# Patient Record
Sex: FEMALE | Race: WHITE | NOT HISPANIC OR LATINO | Employment: STUDENT | URBAN - METROPOLITAN AREA
[De-identification: names, ages, dates, MRNs, and addresses within clinical notes are randomized per-mention and may not be internally consistent; named-entity substitution may affect disease eponyms.]

---

## 2017-04-20 ENCOUNTER — COMMUNICATION - HEALTHEAST (OUTPATIENT)
Dept: FAMILY MEDICINE | Facility: CLINIC | Age: 32
End: 2017-04-20

## 2017-06-07 ENCOUNTER — COMMUNICATION - HEALTHEAST (OUTPATIENT)
Dept: FAMILY MEDICINE | Facility: CLINIC | Age: 32
End: 2017-06-07

## 2017-06-19 ENCOUNTER — COMMUNICATION - HEALTHEAST (OUTPATIENT)
Dept: FAMILY MEDICINE | Facility: CLINIC | Age: 32
End: 2017-06-19

## 2017-06-22 ENCOUNTER — COMMUNICATION - HEALTHEAST (OUTPATIENT)
Dept: FAMILY MEDICINE | Facility: CLINIC | Age: 32
End: 2017-06-22

## 2017-06-28 ENCOUNTER — PRENATAL OFFICE VISIT - HEALTHEAST (OUTPATIENT)
Dept: FAMILY MEDICINE | Facility: CLINIC | Age: 32
End: 2017-06-28

## 2017-06-28 DIAGNOSIS — Z32.01 POSITIVE PREGNANCY TEST: ICD-10-CM

## 2017-06-28 DIAGNOSIS — N92.6 ABNORMAL MENSES: ICD-10-CM

## 2017-06-28 DIAGNOSIS — Z34.90 NORMAL PREGNANCY: ICD-10-CM

## 2017-06-28 LAB — HIV 1+2 AB+HIV1 P24 AG SERPL QL IA: NEGATIVE

## 2017-06-29 ENCOUNTER — HOSPITAL ENCOUNTER (OUTPATIENT)
Dept: ULTRASOUND IMAGING | Facility: CLINIC | Age: 32
Discharge: HOME | End: 2017-06-29
Attending: PHYSICIAN ASSISTANT

## 2017-06-29 DIAGNOSIS — Z34.90 NORMAL PREGNANCY: ICD-10-CM

## 2017-06-29 DIAGNOSIS — Z32.01 POSITIVE PREGNANCY TEST: ICD-10-CM

## 2017-06-29 LAB
HBV SURFACE AG SERPL QL IA: NEGATIVE
SYPHILIS RPR SCREEN - HISTORICAL: NORMAL

## 2017-07-26 ENCOUNTER — PRENATAL OFFICE VISIT - HEALTHEAST (OUTPATIENT)
Dept: FAMILY MEDICINE | Facility: CLINIC | Age: 32
End: 2017-07-26

## 2017-07-26 DIAGNOSIS — Z34.91 SUPERVISION OF NORMAL PREGNANCY IN FIRST TRIMESTER: ICD-10-CM

## 2017-11-08 ENCOUNTER — OFFICE VISIT (OUTPATIENT)
Dept: FAMILY MEDICINE | Facility: CLINIC | Age: 32
End: 2017-11-08

## 2017-11-08 VITALS
DIASTOLIC BLOOD PRESSURE: 74 MMHG | WEIGHT: 173.6 LBS | SYSTOLIC BLOOD PRESSURE: 119 MMHG | OXYGEN SATURATION: 98 % | TEMPERATURE: 98.2 F | RESPIRATION RATE: 16 BRPM | HEIGHT: 63 IN | BODY MASS INDEX: 30.76 KG/M2 | HEART RATE: 104 BPM

## 2017-11-08 DIAGNOSIS — O09.90 HIGH-RISK PREGNANCY, UNSPECIFIED TRIMESTER: ICD-10-CM

## 2017-11-08 DIAGNOSIS — K21.9 GASTROESOPHAGEAL REFLUX DISEASE, ESOPHAGITIS PRESENCE NOT SPECIFIED: ICD-10-CM

## 2017-11-08 DIAGNOSIS — F19.20 CHEMICAL DEPENDENCY (H): Primary | ICD-10-CM

## 2017-11-08 DIAGNOSIS — Z72.0 TOBACCO USE: ICD-10-CM

## 2017-11-08 DIAGNOSIS — N30.00 ACUTE CYSTITIS WITHOUT HEMATURIA: ICD-10-CM

## 2017-11-08 DIAGNOSIS — N63.0 LUMP OR MASS IN BREAST: ICD-10-CM

## 2017-11-08 DIAGNOSIS — F15.93 WITHDRAWAL FROM OTHER STIMULANT DRUG (H): ICD-10-CM

## 2017-11-08 PROBLEM — F60.3 BORDERLINE PERSONALITY DISORDER (H): Status: ACTIVE | Noted: 2017-11-08

## 2017-11-08 PROBLEM — F41.9 ANXIETY: Status: ACTIVE | Noted: 2017-11-08

## 2017-11-08 PROBLEM — F90.2 ATTENTION DEFICIT HYPERACTIVITY DISORDER (ADHD), COMBINED TYPE: Status: ACTIVE | Noted: 2017-11-08

## 2017-11-08 PROBLEM — F43.10 PTSD (POST-TRAUMATIC STRESS DISORDER): Status: ACTIVE | Noted: 2017-11-08

## 2017-11-08 PROBLEM — F33.1 MODERATE EPISODE OF RECURRENT MAJOR DEPRESSIVE DISORDER (H): Status: ACTIVE | Noted: 2017-11-08

## 2017-11-08 RX ORDER — PNV NO.95/FERROUS FUM/FOLIC AC 28MG-0.8MG
1 TABLET ORAL
Qty: 90 TABLET | Refills: 3 | Status: SHIPPED | OUTPATIENT
Start: 2017-11-08 | End: 2020-03-05

## 2017-11-08 ASSESSMENT — ANXIETY QUESTIONNAIRES
7. FEELING AFRAID AS IF SOMETHING AWFUL MIGHT HAPPEN: MORE THAN HALF THE DAYS
6. BECOMING EASILY ANNOYED OR IRRITABLE: MORE THAN HALF THE DAYS
5. BEING SO RESTLESS THAT IT IS HARD TO SIT STILL: MORE THAN HALF THE DAYS
GAD7 TOTAL SCORE: 14
1. FEELING NERVOUS, ANXIOUS, OR ON EDGE: MORE THAN HALF THE DAYS
IF YOU CHECKED OFF ANY PROBLEMS ON THIS QUESTIONNAIRE, HOW DIFFICULT HAVE THESE PROBLEMS MADE IT FOR YOU TO DO YOUR WORK, TAKE CARE OF THINGS AT HOME, OR GET ALONG WITH OTHER PEOPLE: EXTREMELY DIFFICULT
2. NOT BEING ABLE TO STOP OR CONTROL WORRYING: MORE THAN HALF THE DAYS
3. WORRYING TOO MUCH ABOUT DIFFERENT THINGS: MORE THAN HALF THE DAYS

## 2017-11-08 ASSESSMENT — PATIENT HEALTH QUESTIONNAIRE - PHQ9
5. POOR APPETITE OR OVEREATING: MORE THAN HALF THE DAYS
SUM OF ALL RESPONSES TO PHQ QUESTIONS 1-9: 13

## 2017-11-08 ASSESSMENT — PAIN SCALES - GENERAL: PAINLEVEL: MILD PAIN (3)

## 2017-11-08 NOTE — PROGRESS NOTES
HPI:       Cynthia Garcia is a 32 year old who presents for the following  Patient presents with:  MOOD CHANGES    Cynthia is a 32 year old female with hx of chem dependency admitted to [facility] 17, clean from IV meth 17. She presents to clinic for discussion of several concerns. She reports she is still having some withdrawal symptoms, is feeling tired and in general does not feel like answer many questions at the visit.     Patient reports she was abusing meth and using it IV and injecting it into her breasts. She reports she has been using since . She would like her breasts checked due to noting lumps from injecting the meth. She has recently started the [facility] program and has been very tired her first 2 days in the program and has been mostly sleeping. The nurse feels she is ready to start in some of her programming. She reports getting some chills/ night sweats since being at [facility], fatigue. She is using tobacco and has been throughout her current pregnancy, she is smoking cigarettes, 1/2 ppd.     The patient is pregnant, she reports she is a  due to meth use, she is a high risk pregnancy. She had started prenatal visits in [facility], her last visit was 17 and has not followed up since then. She was told her EDC is 17, she is 27 weeks along. She Has 4 living children. Her baby will hopefully come to [facility] her youngest will be 2 years old in December. Her 3 older children are with the patient's step dad, their ages are 13, 10, 8. She is not currently taking a PNV and needs one prescribed.     Patient reports she had started Taking macrobid for UTI, currently she denies having any UTI symptoms.     Heartburn- Patient reports she is getting acid reflux symptoms daily with all 3 meals, in the past she was on Zantac and is wondering if she can restart this.    She denies constipation, has a BM every day    Overall, patient feels tired, reports a past hx of Migraines several times a  "year. She also has a hx of Depression and anxiety, PTSD, Borderline, ADHD, maybe bipolar. In 2010 she was on medications. She was sober in 2010. Patient reports she sometimes gets pain in her hips.      PHQ-9 SCORE 11/8/2017   Total Score 13     MAXIM-7 SCORE 11/8/2017   Total Score 14     Problem, Medication and Allergy Lists were reviewed and are current.  Patient is   a new patient to this clinic and so  I reviewed/updated the Past Medical History, the Family History and the Social History. ,   Past Medical History:   Diagnosis Date     Anxiety      Chemical dependency (H)    ,   Family History     Problem (# of Occurrences) Relation (Name,Age of Onset)    DIABETES (1) Maternal Grandmother    Lung Cancer (1) Mother       and   Social History     Social History     Marital status: Single     Spouse name: N/A     Number of children: 4     Years of education: Some college     Social History Main Topics     Smoking status: Current Every Day Smoker     Packs/day: 0.50     Types: Cigarettes     Smokeless tobacco: Never Used     Alcohol use No     Drug use: Yes     Special: IV, Methamphetamines      Comment: Clean from meth 11/6/17     Sexual activity: Not Currently     Partners: Male      Comment: Currently pregnant     Other Topics Concern     None     Social History Narrative     None     Past Surgical History:   Procedure Laterality Date     DENTAL SURGERY      [place] teeth removed     ENT SURGERY      Tonsillectomy age 18          Review of Systems:   Review of Systems    ROS: 10 point ROS neg other than the symptoms noted above in the HPI.         Physical Exam:     Patient Vitals for the past 24 hrs:   BP Temp Temp src Pulse Resp SpO2 Height Weight   11/08/17 0906 119/74 98.2  F (36.8  C) Oral 104 16 98 % 5' 2.5\" (158.8 cm) 173 lb 9.6 oz (78.7 kg)     Body mass index is 31.25 kg/(m^2).  Vitals were reviewed and were normal except elevated pulse at 104     Physical Exam   Constitutional: She appears well-developed " and well-nourished.   HENT:   Head: Normocephalic and atraumatic.   Right Ear: Tympanic membrane and ear canal normal.   Left Ear: Tympanic membrane and ear canal normal.   Nose: Nose normal.   Mouth/Throat: Uvula is midline, oropharynx is clear and moist and mucous membranes are normal.   Eyes: Conjunctivae and EOM are normal. Pupils are equal, round, and reactive to light.   Neck: Carotid bruit is not present.   Cardiovascular: Normal rate and regular rhythm.    Pulses:       Radial pulses are 2+ on the right side, and 2+ on the left side.        Dorsalis pedis pulses are 2+ on the right side, and 2+ on the left side.   Pulmonary/Chest: Effort normal and breath sounds normal.   Abdominal: Normal appearance. There is no tenderness.   27 weeks pregnant   Genitourinary: There is breast tenderness.   Genitourinary Comments: Lumps and bruising to breasts bilaterally from meth injection.   Musculoskeletal:        Right hip: Normal.        Left hip: Normal.   Lymphadenopathy:     She has no cervical adenopathy.   Neurological: She is alert.   Skin:   Skin with bruising noted to arms and breasts from IV drug use. Breasts with lumps and firm masses near locations of IV drug use sites. No erythema, edema, open wounds to arms or breasts.    Psychiatric: Her speech is normal. Thought content normal. Her mood appears anxious. Her affect is angry. She is agitated. She exhibits a depressed mood. She expresses no suicidal plans and no homicidal plans.   Was agitated and frustrated with jumbled speech and lightly hit herself in the face during frustration. Tired.       Results:      Results from the last 24 hoursNo results found for this or any previous visit (from the past 24 hour(s)).  Assessment and Plan     1. Chemical dependency (H)  Encouraged patient to continue in her  [facility] chem dep program, patient admitted 11/6/17, clean 11/6/17 from IV meth. Continue to monitor. Continue to monitor mood with hx of adhd, anxiety,  depression, borderline  PHQ-9 SCORE 2017   Total Score 13     MAXIM-7 SCORE 2017   Total Score 14     2. Tobacco use  Smoking 1/2 ppd of cigarettes, she is currently pregnant, will have patient follow up with OB. It would be of huge benefit for patient to quit smoking, she will first address her meth use then her tobacco use.     3. Withdrawal from other stimulant drug (H)  Patient reports fatigue and chills and some withdrawal symptoms and she is improving. Did discuss with nurse to continue to monitor and to have her start up classes when she feels improved.     4. Lump or mass in breast  Lumps to breasts bilaterally from IV injection of meth, bruising noted to sites as well. Discussed that we will continue to monitor her breasts and in several weeks will follow up with ultrasound for assessment of her lumps. She reports that even prior to breast IV drug use, she did have a lump in her breast that she was supposed to follow up on and did not. Will follow up and get ultrasound in the next few weeks after acute swelling and bruising from IV meth injection improves. No sign of active infection currently.       5. High-risk pregnancy, unspecified trimester  27 weeks with high risk pregnancy and meth use with IV injection into breasts.  with EDC 18. Called nurse and will have patient follow up with OB, last prenatal care visit was 3 months ago. Will start PNV  - Prenatal Vit-Fe Fumarate-FA (PRENATAL VITAMINS) 28-0.8 MG TABS; Take 1 tablet by mouth daily (with breakfast) (Patient not taking: Reported on 2017)  Dispense: 90 tablet; Refill: 3    6. Acute cystitis without hematuria  Patient reports symptoms improving, called nurse, patient still has 4 days left of treatment, recommended that patient finish her 4 days of macrobid 100 mg po bid x 4 more days then D/C. Follow up with OB.    7. Gastroesophageal reflux disease, esophagitis presence not specified  Severe GERD with symptoms with 3 meals a day  7 days a week, will start zantac, if no improvement, consider prilosec.  - ranitidine (ZANTAC) 150 MG tablet; Take 1 tablet (150 mg) by mouth 2 times daily (Patient not taking: Reported on 11/8/2017)  Dispense: 60 tablet; Refill: 3  There are no discontinued medications.     Options for treatment and follow-up care were reviewed with the patient. Cynthia Garcia  engaged in the decision making process and verbalized understanding of the options discussed and agreed with the final plan.    PEGGY Buchanan paperwork filled out and returned to patient, consent form signed for authorization to discuss protected health information with Yadira Montgomery RN, PEGGY Buchanan RN.    Follow up in 2 weeks. Total time spent with patient 50 minutes of 60 minutes with face to face counseling.     Aisha Constantino NP

## 2017-11-09 ASSESSMENT — ANXIETY QUESTIONNAIRES: GAD7 TOTAL SCORE: 14

## 2018-01-28 ENCOUNTER — HEALTH MAINTENANCE LETTER (OUTPATIENT)
Age: 33
End: 2018-01-28

## 2018-07-09 ENCOUNTER — COMMUNICATION - HEALTHEAST (OUTPATIENT)
Dept: FAMILY MEDICINE | Facility: CLINIC | Age: 33
End: 2018-07-09

## 2018-07-09 ENCOUNTER — OFFICE VISIT - HEALTHEAST (OUTPATIENT)
Dept: FAMILY MEDICINE | Facility: CLINIC | Age: 33
End: 2018-07-09

## 2018-07-09 DIAGNOSIS — N39.0 URINARY TRACT INFECTION: ICD-10-CM

## 2018-07-09 DIAGNOSIS — R30.0 DYSURIA: ICD-10-CM

## 2018-07-09 LAB
ALBUMIN UR-MCNC: NEGATIVE MG/DL
APPEARANCE UR: CLEAR
BACTERIA #/AREA URNS HPF: ABNORMAL HPF
BILIRUB UR QL STRIP: NEGATIVE
COLOR UR AUTO: YELLOW
GLUCOSE UR STRIP-MCNC: NEGATIVE MG/DL
HGB UR QL STRIP: ABNORMAL
KETONES UR STRIP-MCNC: NEGATIVE MG/DL
LEUKOCYTE ESTERASE UR QL STRIP: ABNORMAL
NITRATE UR QL: NEGATIVE
PH UR STRIP: 6 [PH] (ref 5–8)
RBC #/AREA URNS AUTO: ABNORMAL HPF
SP GR UR STRIP: >=1.03 (ref 1–1.03)
SQUAMOUS #/AREA URNS AUTO: ABNORMAL LPF
UROBILINOGEN UR STRIP-ACNC: ABNORMAL
WBC #/AREA URNS AUTO: ABNORMAL HPF

## 2018-07-11 LAB — BACTERIA SPEC CULT: ABNORMAL

## 2019-04-01 ENCOUNTER — TRANSFERRED RECORDS (OUTPATIENT)
Dept: HEALTH INFORMATION MANAGEMENT | Facility: CLINIC | Age: 34
End: 2019-04-01

## 2019-05-10 ENCOUNTER — COMMUNICATION - HEALTHEAST (OUTPATIENT)
Dept: FAMILY MEDICINE | Facility: CLINIC | Age: 34
End: 2019-05-10

## 2019-05-10 DIAGNOSIS — Z71.6 ENCOUNTER FOR SMOKING CESSATION COUNSELING: ICD-10-CM

## 2020-01-14 ENCOUNTER — TRANSFERRED RECORDS (OUTPATIENT)
Dept: HEALTH INFORMATION MANAGEMENT | Facility: CLINIC | Age: 35
End: 2020-01-14

## 2020-03-04 ENCOUNTER — HOSPITAL ENCOUNTER (OUTPATIENT)
Dept: BEHAVIORAL HEALTH | Facility: CLINIC | Age: 35
Discharge: HOME | End: 2020-03-04
Attending: FAMILY MEDICINE | Admitting: FAMILY MEDICINE
Payer: COMMERCIAL

## 2020-03-04 VITALS
HEIGHT: 62 IN | BODY MASS INDEX: 27.6 KG/M2 | SYSTOLIC BLOOD PRESSURE: 112 MMHG | HEART RATE: 87 BPM | DIASTOLIC BLOOD PRESSURE: 71 MMHG | WEIGHT: 150 LBS

## 2020-03-04 PROCEDURE — 10020000 ZZH LODGING PLUS FACILITY CHARGE ADULT

## 2020-03-04 PROCEDURE — H2035 A/D TX PROGRAM, PER HOUR: HCPCS

## 2020-03-04 PROCEDURE — H2035 A/D TX PROGRAM, PER HOUR: HCPCS | Mod: HQ

## 2020-03-04 RX ORDER — ACETAMINOPHEN 325 MG/1
325-650 TABLET ORAL EVERY 4 HOURS PRN
COMMUNITY
End: 2021-10-25

## 2020-03-04 RX ORDER — LORATADINE 10 MG/1
10 TABLET ORAL DAILY PRN
COMMUNITY
End: 2021-10-25

## 2020-03-04 RX ORDER — AMOXICILLIN 250 MG
2 CAPSULE ORAL DAILY PRN
COMMUNITY
End: 2021-10-25

## 2020-03-04 RX ORDER — IBUPROFEN 200 MG
200-400 TABLET ORAL EVERY 6 HOURS PRN
COMMUNITY
End: 2021-10-25

## 2020-03-04 RX ORDER — MAGNESIUM HYDROXIDE/ALUMINUM HYDROXICE/SIMETHICONE 120; 1200; 1200 MG/30ML; MG/30ML; MG/30ML
30 SUSPENSION ORAL EVERY 6 HOURS PRN
COMMUNITY
End: 2021-10-25

## 2020-03-04 RX ORDER — LANOLIN ALCOHOL/MO/W.PET/CERES
3 CREAM (GRAM) TOPICAL
COMMUNITY
End: 2021-10-25

## 2020-03-04 ASSESSMENT — ANXIETY QUESTIONNAIRES
7. FEELING AFRAID AS IF SOMETHING AWFUL MIGHT HAPPEN: NOT AT ALL
3. WORRYING TOO MUCH ABOUT DIFFERENT THINGS: SEVERAL DAYS
5. BEING SO RESTLESS THAT IT IS HARD TO SIT STILL: MORE THAN HALF THE DAYS
GAD7 TOTAL SCORE: 11
4. TROUBLE RELAXING: NEARLY EVERY DAY
1. FEELING NERVOUS, ANXIOUS, OR ON EDGE: SEVERAL DAYS
6. BECOMING EASILY ANNOYED OR IRRITABLE: MORE THAN HALF THE DAYS
IF YOU CHECKED OFF ANY PROBLEMS ON THIS QUESTIONNAIRE, HOW DIFFICULT HAVE THESE PROBLEMS MADE IT FOR YOU TO DO YOUR WORK, TAKE CARE OF THINGS AT HOME, OR GET ALONG WITH OTHER PEOPLE: VERY DIFFICULT
2. NOT BEING ABLE TO STOP OR CONTROL WORRYING: MORE THAN HALF THE DAYS

## 2020-03-04 ASSESSMENT — PATIENT HEALTH QUESTIONNAIRE - PHQ9: SUM OF ALL RESPONSES TO PHQ QUESTIONS 1-9: 15

## 2020-03-04 ASSESSMENT — MIFFLIN-ST. JEOR: SCORE: 1338.65

## 2020-03-04 ASSESSMENT — PAIN SCALES - GENERAL: PAINLEVEL: NO PAIN (0)
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
## 2020-03-04 NOTE — PROGRESS NOTES
Progress Note    This patient had a Comprehensive Substance Abuse assessment on 2/14/2020 completed by Anastasia Sims.  This patient was seen for a face to face update of the Comprehensive Substance Abuse assessment on 3/4/2020 by MONICA Gtz.  OUTSIDE: A paper copy of the Comprehensive Substance Abuse assessment will be placed in the patient's paper chart until being scanned into the patient's electronic medical record in Epic under the Media tab.    Alcohol/Drug use since the last CD evaluation (include date of last use):     Meth: IV, every 1 or 2 days, 1/4 gram  GISSELL: 3/3/2020  IV, 1/4 gram     Please note any other clinical changes since the last CD evaluation (such as medication changes, additional legal charges, detoxification admissions, overdoses, etc.)     No significant changes since the last CD evaluation       ASAM Dimensions Original scores Current Scores   I.) Intoxication and Withdrawal: 0 0   II.) Biomedical:  0 0   III.) Emotional and Behavioral:  2 2   IV.) Readiness to Change:  2 2   V.) Relapse Potential: 4 4   VI.) Recovery Environmental: 4 4     Please list clinical justifications for the above ASAM score changes since the original comprehensive assessment:     None of the ASAM scores on the six dimensions had changed since the Comprehensive Substance Abuse assessment was completed on 2/14/2020.       Current LORNE: Current UA:     0.000     Positive for Amphetamines and Methamphetamine and negative for all other screened drugs.       PHQ-9, MAXIM-7   PHQ-9 on 3/4/2020 MAXIM-7 on 3/4/2020   The patient's PHQ-9 score was 15 out of 27, indicating moderately severe depression.   The patient's MAXIM-7 score was 11 out of 21, indicating moderate anxiety.       [place]-Suicide Severity Rating Scale Reassessment   Have you ever wished you were dead or that you could go to sleep and not wake up?  Past Month:  Yes     Have you actually had any thoughts of killing yourself?  Past Month:  No      Have you been thinking about how you might do this?     Past Month:  No   Lifetime:  No   Have you had these thoughts and had some intention of acting on them?     Past Month:  No   Lifetime:  No   Have you started to work out the details of how to kill yourself?   Past Month:  No   Lifetime:  No   Do you intend to carry out this plan?   No     When you have the thoughts how long do they last?  The patient denied ever having any suicidal thoughts in life.     Are there things - anyone or anything (i.e. family, Rastafarian, pain of death) that stopped you from wanting to die or acting on thoughts of suicide?  Does not apply       2008  [employer] [facility]  Used with permission by Steph Browning, PhD.       Guide to C-SSRS Risk Ratings   NO IDEATION:  with no active thoughts IDEATION: with a wish to die. IDEATION: with active thoughts. Risk Ratings   If Yes No No 0 - Very Low Risk   If NA Yes No 1 - Low Risk   If NA Yes Yes 2 - Low/moderate risk   IDEATION: associated thoughts of methods without intent or plan INTENT: Intent to follow through on suicide PLAN: Plan to follow through on suicide Risk Ratings cont...   If Yes No No 3 - Moderate Risk   If Yes Yes No 4 - High Risk   If Yes Yes Yes 5 - High Risk   The patient's ADDITIONAL RISK FACTORS and lack of PROTECTIVE FACTORS may increase their overall suicide risk ratings.     Additional Risk Factors:    Tendency to be socially isolated and/or cut off from the support of others     A recent death of someone close to the patient and/or unresolved grief and loss issues     A recent loss that was significant to the patient, i.e. loss of job, loss of home, divorce, break-up, etc.     Significant history of trauma and/or abuse issues     History of impulsive or aggressive behaviors     Significant legal problems including being at risk of incarceration   Protective Factors:    Having people in his/her life that would prevent the patient from  "considering a suicide attempt (i.e. young children, spouse, parents, etc.)     Risk Status   0. - Very Low Risk:  Evaluation Counselors:  Document in Epic / SBAR to counselor \"Very Low Risk\".      Treatment Counselors:  Reassess upon admission as applicable, assess weekly in progress notes under Dimension 3 and summarize in Discharge / Treatment summary under Dimension 3.     Additional information to support suicide risk rating: There was no additional information to provide at this time.     "
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
## 2020-03-04 NOTE — PROGRESS NOTES
Initial Services Plan        Service Initiation Date: 3/4/2020    Immediate health and/or safety concerns: No    Identify health and safety concern(s) below and include plan to address:    None Identified    Treatment suggestions for client during the time between intake (admit date) and completion of the individual treatment plan:     Look for a sober support network, i.e. 12 step, [facility], [facility], etc  Tour the treatment center or outpatient clinic  Introduce yourself to your treatment group. Spend time getting to know your peers  Review your patient or client handbook  Begin working on your treatment goal list    Completed by: MONICA Gtz  Date completed: 3/4/2020 at 12:10 PM

## 2020-03-04 NOTE — PROGRESS NOTES
Name: Cynthia Garcia  Date: 3/4/2020  Medical Record: [id number]    Envelope Number: [id number]    List of Contents (List each item separately in new row):   Cell phone (cracked)    Admission:  I am responsible for any personal items that are not sent to the safe or pharmacy.  [facility] is not responsible for loss, theft or damage of any property in my possession.      Patient Signature:  ___________________________________________       Date/Time:__________________________    Staff Signature: __________________________________       Date/Time:__________________________    2nd Staff person, if patient is unable/unwilling to sign:      __________________________________________________________       Date/Time: __________________________      Discharge:  [facility] has returned all of my personal belongings:    Patient Signature: ________________________________________     Date/Time: ____________________________________    Staff Signature: ______________________________________     Date/Time:_____________________________________

## 2020-03-04 NOTE — PROGRESS NOTES
"[facility] Health Assessment      Vital signs:     /71   Pulse 87   Ht 1.575 m (5' 2\")   Wt 68 kg (150 lb)   BMI 27.44 kg/m        Direct admission    Counselor: Lucius  Drug of Choice: Meth  Last use: 3/3/2020  Home clinic/MD: pt currently does not have but will work with [facility] RN to set one up during he stay at   Psychiatrist/therapist: Pt has been diagnosed with ADHD in the past.  Has not seen Psychiatry for this since 2010 and may be interested in re-visiting this    Pt may want to be seen by our Psychiatry inpt staff.  Please let us know    Medical history/current conditions:  none    H&P Screen:  H&P within the last 90 days: No.  Patient has been informed they are required to obtain an H&P within the next 14 days.  IPC appt date and time : 3/5 at 9 am with [facility] diagnosis: ADHD, borderline personality disorder, Major depressive disorder, PTSD    Medication compliant?: pt currently not taking any medications  Recent sucidal thoughts? no     When? na  Current thought of self-harm? no    Plan? na    Pain assessment:   Pt. Experiencing pain at this time?  No      Nursing Assessment Summary:  Use tobacco - smokes - NRT products offered    Flu vaccination - no.  Will ask pt to receive during office visit at [facility]    When making IPC appt will ask them to address anxiety, Insomnia and flu vaccination    Integrative Therapies: Essential Oils    Patient requesting Calm and Veti Yumiko essential oil inhaler to manage (Sleep/anxiety/mood). Discussed appropriate use of essential oil inhalers and instructed patient not to leave labeled product out on unit.     Patient verbalized and demonstrated understanding of how to use essential oil inhaler correctly and will notify [facility] RN with any concerns or side effects. Patient agrees not to share their essential oil inhaler with other clients.    Continue to support the patient in safely utilizing integrative therapies as able to " manage symptoms during treatment.     On-going nursing intervention required?   No    Acute care visit recommended: no

## 2020-03-05 ENCOUNTER — HOSPITAL ENCOUNTER (OUTPATIENT)
Dept: BEHAVIORAL HEALTH | Facility: CLINIC | Age: 35
End: 2020-03-05
Attending: FAMILY MEDICINE
Payer: COMMERCIAL

## 2020-03-05 ENCOUNTER — OFFICE VISIT (OUTPATIENT)
Dept: BEHAVIORAL HEALTH | Facility: CLINIC | Age: 35
End: 2020-03-05
Payer: COMMERCIAL

## 2020-03-05 VITALS
OXYGEN SATURATION: 99 % | HEIGHT: 63 IN | TEMPERATURE: 97 F | WEIGHT: 144 LBS | HEART RATE: 82 BPM | DIASTOLIC BLOOD PRESSURE: 52 MMHG | BODY MASS INDEX: 25.52 KG/M2 | SYSTOLIC BLOOD PRESSURE: 110 MMHG | RESPIRATION RATE: 16 BRPM

## 2020-03-05 DIAGNOSIS — Z23 NEED FOR PROPHYLACTIC VACCINATION AND INOCULATION AGAINST INFLUENZA: Primary | ICD-10-CM

## 2020-03-05 DIAGNOSIS — F19.20 CHEMICAL DEPENDENCY (H): ICD-10-CM

## 2020-03-05 DIAGNOSIS — H61.22 IMPACTED CERUMEN OF LEFT EAR: ICD-10-CM

## 2020-03-05 DIAGNOSIS — F33.1 MODERATE EPISODE OF RECURRENT MAJOR DEPRESSIVE DISORDER (H): ICD-10-CM

## 2020-03-05 DIAGNOSIS — F60.3 BORDERLINE PERSONALITY DISORDER (H): ICD-10-CM

## 2020-03-05 DIAGNOSIS — F90.2 ATTENTION DEFICIT HYPERACTIVITY DISORDER (ADHD), COMBINED TYPE: ICD-10-CM

## 2020-03-05 DIAGNOSIS — F43.10 PTSD (POST-TRAUMATIC STRESS DISORDER): ICD-10-CM

## 2020-03-05 PROCEDURE — 69209 REMOVE IMPACTED EAR WAX UNI: CPT | Mod: LT | Performed by: NURSE PRACTITIONER

## 2020-03-05 PROCEDURE — 10020000 ZZH LODGING PLUS FACILITY CHARGE ADULT

## 2020-03-05 PROCEDURE — 99214 OFFICE O/P EST MOD 30 MIN: CPT | Mod: 25 | Performed by: NURSE PRACTITIONER

## 2020-03-05 PROCEDURE — H2035 A/D TX PROGRAM, PER HOUR: HCPCS | Mod: HQ

## 2020-03-05 ASSESSMENT — MIFFLIN-ST. JEOR: SCORE: 1319.37

## 2020-03-05 ASSESSMENT — ANXIETY QUESTIONNAIRES: GAD7 TOTAL SCORE: 11

## 2020-03-05 NOTE — PROGRESS NOTES
CC: Patient is a 35 year old  female who presents for History and Physical for [facility].     Patient reports feeling light headed and some dizziness today. States that she has not been drinking enough water but maybe not enough. Denies past issues with this. BP is good today.   BP Readings from Last 3 Encounters:   03/05/20 110/52   11/08/17 119/74       Patient is currently at [facility] for Methamphetamine use. Reports that she was admitted yesterday. States that she uses needled and ibarra not share and is currently not concerned about any risks of infection.     Patient reports that she is single with 5 kids:  3 children with family and 2 adopted out.     Reports that she smokes 5 cigarettes per day. Denies any use of alcohol or other drugs.   Denies any issues with sleep, appetite. Not currently exercising.     Patient reports that her mental health history consists of ADHD, PTSD, MDD, Boderline personality disorder, hs had no psychiatric care since 2011. Has not either been on medications since 2011. States that she needs psych eval.     Reports that she does not have a PCP.     Patient Active Problem List   Diagnosis     Chemical dependency (H)     High-risk pregnancy, unspecified trimester     Gastroesophageal reflux disease, esophagitis presence not specified     PTSD (post-traumatic stress disorder)     Borderline personality disorder (H)     Attention deficit hyperactivity disorder (ADHD), combined type     Anxiety     Moderate episode of recurrent major depressive disorder (H)     Tobacco use       Past Medical History:   Diagnosis Date     Anxiety      Chemical dependency (H)        Past Surgical History:   Procedure Laterality Date     DENTAL SURGERY      Ace teeth removed     ENT SURGERY      Tonsillectomy age 18       Family History   Problem Relation Age of Onset     Lung Cancer Mother      Substance Abuse Mother      Diabetes Maternal Grandmother      Depression Maternal Grandmother       Anxiety Disorder Maternal Grandmother      Substance Abuse Father      Substance Abuse Brother      Depression Brother      Substance Abuse Brother      Depression Brother        Social History     Socioeconomic History     Marital status: Single     Spouse name: Not on file     Number of children: 4     Years of education: Some college     Highest education level: Not on file   Occupational History     Not on file   Social Needs     Financial resource strain: Not on file     Food insecurity:     Worry: Not on file     Inability: Not on file     Transportation needs:     Medical: Not on file     Non-medical: Not on file   Tobacco Use     Smoking status: Current Every Day Smoker     Packs/day: 0.50     Types: Cigarettes     Smokeless tobacco: Never Used   Substance and Sexual Activity     Alcohol use: No     Drug use: Yes     Types: IV, Methamphetamines     Comment: Clean from meth 11/6/17     Sexual activity: Not Currently     Partners: Male     Comment: Currently pregnant   Lifestyle     Physical activity:     Days per week: Not on file     Minutes per session: Not on file     Stress: Not on file   Relationships     Social connections:     Talks on phone: Not on file     Gets together: Not on file     Attends Moravian service: Not on file     Active member of club or organization: Not on file     Attends meetings of clubs or organizations: Not on file     Relationship status: Not on file     Intimate partner violence:     Fear of current or ex partner: Not on file     Emotionally abused: Not on file     Physically abused: Not on file     Forced sexual activity: Not on file   Other Topics Concern     Parent/sibling w/ CABG, MI or angioplasty before 65F 55M? Not Asked   Social History Narrative    Activity:    Diet:    Water Intake:    Caffeine Intake:    Sleep:    Stressors:    Support Network:    Jaleesa/Moravian Affiliation:    Childhood:    Current Living Situation:    Future:       ROS:  CONSTITUTIONAL: NEGATIVE  "for fever, chills, change in weight  ENT: NEGATIVE for ear, mouth and throat problems  CV: NEGATIVE for chest pain, palpitations or peripheral edema  GI: NEGATIVE for nausea, abdominal pain, heartburn, or change in bowel habits  C: NEGATIVE for fever, chills, change in weight  I: NEGATIVE for worrisome rashes, moles or lesions  E: NEGATIVE for vision changes or irritation  R: NEGATIVE for significant cough or SOB  M: NEGATIVE for significant arthralgias or myalgia  N: NEGATIVE for weakness, dizziness or paresthesias  P: NEGATIVE for changes in mood or affect  : negative for dysuria, hematuria, urethral discharge      OBJECTIVE:  Vital signs:                     /52   Pulse 82   Temp 97  F (36.1  C) (Temporal)   Resp 16   Ht 1.588 m (5' 2.5\")   Wt 65.3 kg (144 lb)   SpO2 99%   Breastfeeding No   BMI 25.92 kg/m      Exam:  Constitutional: healthy, alert and no distress  Head: Normocephalic. No masses, lesions, tenderness or abnormalities  Neck: Neck supple. No adenopathy. Thyroid symmetric, normal size,  ENT: ENT exam normal, no neck nodes or sinus tenderness and left TM normal without fluid or infection- after cerumen removal.   Cardiovascular: negative\",  RRR. No murmurs, clicks gallops or rub  Respiratory: negative\", Lungs clear  Gastrointestinal: negative, negative findings: bowel sounds normal  Musculoskeletal: extremities normal- no gross deformities noted, gait normal and normal muscle tone  Skin: no suspicious lesions or rashes  Neurologic: negative  Psychiatric: mentation appears normal  Hematologic/Lymphatic/Immunologic: normal ant/post cervical and supraclavicular  nodes      ASSESSMENT/PLAN:  (Z23) Need for prophylactic vaccination and inoculation against influenza  (primary encounter diagnosis)  Comment: Patient requesting to get her flu vaccination today.   Plan: INFLUENZA VACCINE IM > 6 MONTHS VALENT IIV4         [03159], Vaccine Administration, Initial         [[id number]]         (H61.22) " Impacted cerumen of left ear  Comment: Patient with impacted cerumen to left ear. Unable to visualize her TM. Patient ok with having her ear washed today.   Plan: REMOVE IMPACTED CERUMEN        -Tympanic normal with visible cone of light after ear wash. Patient reports that her hearing is improved after the wash.     (F19.20) Chemical dependency (H)  Comment: Currently in treatment for methamphetamine use.   Plan: Continue with sobriety and treatment as recommended.     (F33.1) Moderate episode of recurrent major depressive disorder (H)  (F60.3) Borderline personality disorder (H)  (F90.2) Attention deficit hyperactivity disorder (ADHD), combined type  (F43.10) PTSD (post-traumatic stress disorder)  Comment: Patient reports that she has the current diagnosis but is not being treated.  Patient states that she stopped taking her medication in 2011 about the same time she stopped doing psychiatry care. Patient is requesting a referral to psychiatry for medication restart.   Plan: Will discuss with Dr. Caldwell about getting a psychiatry consult in place for patient.   -Patient will also need a new PCP scheduled at discharge.       RIYA Perez [facility] PRIMARY [facility]

## 2020-03-05 NOTE — PROGRESS NOTES
[address]    Dear Sugar Chan,    This is to verify that Cynthia Garcia was admitted for treatment of chemical dependency at [place], [place] on 3/4/20.    This individual is currently participating in:   ______ Inpatient   ___x___ [facility])   ______ Day Outpatient   ______ Evening Outpatient       The client will participate in the program from three to four weeks, depending on the needs of the client.  Treatment is A.A. based and helps clients to achieve a chemically free lifestyle through lectures, individual, family and group therapy. She has been assigned to MONICA John and MONICA Oliva  /  Telephone: [phone] as her primary counselors.  If you have further questions, please contact us.    Respectfully,      MONICA Oliva

## 2020-03-05 NOTE — NURSING NOTE
Cynthia Garcia is a 35 year old female who presents in clinic with complaint of impacted ear wax (cerumen).  Per the order of LS, ear wax was removed from left side  by flushing with warm water  manual debridement has not been performed. Patient denies pain/dizziness/discharge/drainage  (if yes, stop procedure and huddle with provider).  Ear wax has been successfully removed. (If not, huddle with provider).   HECTOR Valadez
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
## 2020-03-05 NOTE — Clinical Note
Patient left yesterday without getting her vaccination. She is welcome to come to clinic on Monday morning and get it. Thanks!RIYA Perez CNP

## 2020-03-06 ENCOUNTER — HOSPITAL ENCOUNTER (OUTPATIENT)
Dept: BEHAVIORAL HEALTH | Facility: CLINIC | Age: 35
End: 2020-03-06
Attending: FAMILY MEDICINE
Payer: COMMERCIAL

## 2020-03-06 ENCOUNTER — TELEPHONE (OUTPATIENT)
Dept: BEHAVIORAL HEALTH | Facility: CLINIC | Age: 35
End: 2020-03-06

## 2020-03-06 PROCEDURE — H2035 A/D TX PROGRAM, PER HOUR: HCPCS | Mod: HQ

## 2020-03-06 NOTE — TELEPHONE ENCOUNTER
"Needing clarification on providers instructions \"Will call and get psychiatry referral in place\"    Does provider want a psych consult for acute concerns while pt  is at [facility] or should patient est with psychiatrist for care going forward, or both. Please reply to [facility] pool [phone] [phone] with further instruction.      "

## 2020-03-06 NOTE — PROGRESS NOTES
This pt didn't attend the lecture. The 5th floor staff woke her up and reminded her that the evening lecture is a mandatory program but still pt didn t come to the lecture.
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
## 2021-05-25 ENCOUNTER — RECORDS - HEALTHEAST (OUTPATIENT)
Dept: ADMINISTRATIVE | Facility: CLINIC | Age: 36
End: 2021-05-25

## 2021-05-27 ENCOUNTER — RECORDS - HEALTHEAST (OUTPATIENT)
Dept: ADMINISTRATIVE | Facility: CLINIC | Age: 36
End: 2021-05-27

## 2021-05-28 NOTE — TELEPHONE ENCOUNTER
I sent her refill for nicotrol inhaler to [facility], but she wants it sent to [facility] [phone]  Can you re-send this?  I can't find it in the EHR

## 2021-05-30 ENCOUNTER — RECORDS - HEALTHEAST (OUTPATIENT)
Dept: ADMINISTRATIVE | Facility: CLINIC | Age: 36
End: 2021-05-30

## 2021-05-31 ENCOUNTER — RECORDS - HEALTHEAST (OUTPATIENT)
Dept: ADMINISTRATIVE | Facility: CLINIC | Age: 36
End: 2021-05-31

## 2021-05-31 VITALS — BODY MASS INDEX: 30.24 KG/M2 | WEIGHT: 168 LBS

## 2021-05-31 VITALS — WEIGHT: 163 LBS | BODY MASS INDEX: 29.34 KG/M2

## 2021-06-02 ENCOUNTER — RECORDS - HEALTHEAST (OUTPATIENT)
Dept: ADMINISTRATIVE | Facility: CLINIC | Age: 36
End: 2021-06-02

## 2021-06-11 NOTE — PROGRESS NOTES
"Chief Complaint:  Chief Complaint   Patient presents with     pregnancy confirm     ultrasound at [facility] in care everywhere on 2017     Breast Mass     HPI:   Cynthia Garcia is a 32 y.o. female is here for pregnancy intake.  She is .  No LMP recorded (lmp unknown). Patient is pregnant.  Was seen at ER on 17, positive pregnancy test.  Ultrasound there showed probable 5 week gestation, no heart tones could be verified.  Smoking less than half a pack a day.  She says she has not used meth for over 1 month, before she knew she was pregnant.  As stated above, she had a positive pregnancy test at the ER on 17.  At ER visit on 6/15/17, said she used meth the day before and wanted to be checked out to start treatment.  Small lump noted in right breast, not painful, feels \"like a bead.\"  She is excited about this pregnancy.  Here with her boyfriend today.  She is in treatment now.    Past medical history: reviewed and updated.  Past Medical History:   Diagnosis Date     Chlamydia infection      Methamphetamine use 2015     Migraine      Urinary tract infection      UTI (lower urinary tract infection) 2015     Replacement Utility updated for latest IMO load     Past Surgical History:   Procedure Laterality Date     TONSILLECTOMY         Current Outpatient Prescriptions:      prenatal vit-iron fum-folic ac (PRENATAL VITAMIN) 27 mg iron- 0.8 mg Tab, Take 1 tablet by mouth once daily., Disp: 100 tablet, Rfl: 3    Social:  Social History   Substance Use Topics     Smoking status: Current Every Day Smoker     Packs/day: 0.50     Years: 11.00     Smokeless tobacco: Never Used      Comment: 1/2 ppd of [facility] [place]     Alcohol use No       OBJECTIVE:  Allergies   Allergen Reactions     Penicillins      Cephalexin Rash     Vitals:    17 1336   BP: 100/60   Pulse: 80   Resp: 24   Temp: 98.8  F (37.1  C)     Body mass index is 30.24 kg/(m^2).    Vital signs stable as recorded above  General: Patient is " alert and oriented x 3, in no apparent distress  Fetal Exam: Fundal height was not palpable, fetal heart tones are not heard.    Results:  Results for orders placed or performed in visit on 06/28/17   Culture, Urine   Result Value Ref Range    Culture No Growth    Pregnancy, Urine   Result Value Ref Range    Pregnancy Test, Urine Positive (!) Negative   Urinalysis, OB Screen   Result Value Ref Range    Glucose, UA Negative Negative    Ketones, UA Negative Negative    Protein, UA Negative Negative mg/dL   ABO/RH Typing (OP order)   Result Value Ref Range    HML ABO/Rh Typing A POS     HML ABO/Rh Repeat Typing A POS    Hepatitis B Surface antigen (HBsAG)   Result Value Ref Range    Hepatitis B Surface Ag Negative Negative   HIV Antigen/Antibody Screening Cascade   Result Value Ref Range    HIV Antigen / Antibody Negative Negative   HML Antibody Screen   Result Value Ref Range    HML Antibody Screen Negative Negative   RPR   Result Value Ref Range    Syphilis Screen Cascade Non-Reactive Non-Reactive   Rubella Immune Status (IgG)   Result Value Ref Range    Rubella IgG Immune Immune   Lead, Blood   Result Value Ref Range    Lead  <5.0 ug/dL    Collection Method Venous    Drugs of Abuse 1,Urine   Result Value Ref Range    Amphetamines Screen Negative Screen Negative    Benzodiazepines Screen Negative Screen Negative    Opiates Screen Negative Screen Negative    Phencyclidine Screen Negative Screen Negative    THC Screen Negative Screen Negative    Barbiturates Screen Negative Screen Negative    Cocaine Metabolite Screen Negative Screen Negative    Oxycodone Screen Negative Screen Negative    Creatinine, Urine 38.8 mg/dL   HM1 (CBC with Diff)   Result Value Ref Range    WBC 10.6 4.0 - 11.0 thou/uL    RBC 4.14 3.80 - 5.40 mill/uL    Hemoglobin 13.2 12.0 - 16.0 g/dL    Hematocrit 37.8 35.0 - 47.0 %    MCV 91 80 - 100 fL    MCH 31.9 27.0 - 34.0 pg    MCHC 34.9 32.0 - 36.0 g/dL    RDW 14.0 11.0 - 14.5 %    Platelets 269 140 -  440 thou/uL    MPV 10.4 8.5 - 12.5 fL    Neutrophils % 64 50 - 70 %    Lymphocytes % 27 20 - 40 %    Monocytes % 6 2 - 10 %    Eosinophils % 2 0 - 6 %    Basophils % 0 0 - 2 %    Neutrophils Absolute 6.8 2.0 - 7.7 thou/uL    Lymphocytes Absolute 2.9 0.8 - 4.4 thou/uL    Monocytes Absolute 0.7 0.0 - 0.9 thou/uL    Eosinophils Absolute 0.3 0.0 - 0.4 thou/uL    Basophils Absolute 0.0 0.0 - 0.2 thou/uL     Other screening pregnancy lab work is ordered and pending.    Patient scored a 7/30 on [place]  Depression Screen.    Assessment and Plan:  1. Pregnancy Intake Appointment.  Cynthia Garcia is 32 y.o. and .  No LMP recorded (lmp unknown). Patient is pregnant.  Estimated Date of Delivery: 18  She will be seeing Dr. Hilton for OB care.  She will be seeing Dr. Trinidad until Dr. Hilton starts in September  Screening pregnancy lab work was drawn.  Prenatal vitamins prescribed.  Problem list and current medications reviewed and updated.  Dating ultrasound ordered.  Prenatal info packet given.    2. Nicotine dependence.  Smoking less than half a pack a day.  Is trying to cut down and quit on her own.    3. History of Methamphetamine Use.  Currently living at [facility] ([phone]).  Says during our visit that she has not used meth for over 1 month, before she knew she was pregnant.  At ER visit on 6/15/17, said she used meth the day before and wanted to be checked out to start treatment.  She had a positive pregnancy test at another ER visit on 17.  Utox negative today.  Has custody of her youngest child, does not have custody of 3 older children.    4. Reported Breast Lump.  Patient did not have time for an exam today.  Will follow-up at next visit.    Follow up in 4 weeks.  Please see OB Episode for complete details.  Visit was approximately 35 minutes, greater than 50% of time spent in face-to-face counseling and coordination of care.    This dictation uses voice  recognition software, which may contain typographical errors.

## 2021-06-12 NOTE — PROGRESS NOTES
Discussed screening for aneuploidy and neural tube defects. Referred for 1st TM screen.    Reviewed intake exam, labs, CHARLENE, past medical history, past surgical history, family history, genetic history, and previous obstetrical history.   Discussed ultrasound results and subchorionic hemorrhage.  Can be rechecked with screening ultrasound.    1. Supervision of normal pregnancy in first trimester  - Urinalysis  - Ambulatory referral to Obstetrics / Gynecology     Discussed closure of [facility].  Will work on plans for delivery.  She declined pap/pelvic exam, today.

## 2021-06-19 NOTE — PROGRESS NOTES
Subjective:    Cynthia Garcia is a 33 y.o. female who presents for evaluation UTI.  She arrived after her appointment was over.  I said I would see her for a visit.  She left her urine, and then said she could not stay for an appointment and left.  I sent her a [employer] message that afternoon to ask what her symptoms were and to get more information, but she did not respond.    Patient Active Problem List   Diagnosis     Migraine Headache     ADHD (attention deficit hyperactivity disorder)     Cyclothymic disorder     Personality disorder     Tobacco use     Normal pregnancy     History of Methamphetamine use       Current Outpatient Prescriptions:      sulfamethoxazole-trimethoprim (SEPTRA DS) 800-160 mg per tablet, Take 1 tablet by mouth 2 (two) times a day for 7 days., Disp: 14 tablet, Rfl: 0     Objective:   Allergies:  Review of patient's allergies indicates no known allergies.    Vitals:  Vitals:    07/09/18 1520   BP: 120/70   Pulse: 76   Resp: 16     There is no height or weight on file to calculate BMI.    Vital signs reviewed.    Results for orders placed or performed in visit on 07/09/18   Culture, Urine   Result Value Ref Range    Culture >100,000 col/ml Escherichia coli (!)        Susceptibility    Escherichia coli - GLADIS     Amoxicillin + Clavulanate <=4/2 Sensitive      Ampicillin <=4 Sensitive      Cefazolin <=1 Sensitive      Cefepime <=1 Sensitive      Ceftriaxone <=1 Sensitive      Ciprofloxacin <=0.5 Sensitive      Gentamicin <=2 Sensitive      Levofloxacin <=1 Sensitive      Meropenem <=0.25 Sensitive      Nitrofurantoin <=16 Sensitive      Tetracycline <=2 Sensitive      Tobramycin <=2 Sensitive      Trimethoprim + Sulfamethoxazole <=0.5 Sensitive    Urinalysis   Result Value Ref Range    Color, UA Yellow Colorless, Yellow, Straw, Light Yellow    Clarity, UA Clear Clear    Glucose, UA Negative Negative    Bilirubin, UA Negative Negative    Ketones, UA Negative Negative    Specific Gravity, UA  >=1.030 1.005 - 1.030    Blood, UA Large (!) Negative    pH, UA 6.0 5.0 - 8.0    Protein, UA Negative Negative mg/dL    Urobilinogen, UA 1.0 E.U./dL 0.2 E.U./dL, 1.0 E.U./dL    Nitrite, UA Negative Negative    Leukocytes, UA Small (!) Negative    Bacteria, UA Moderate (!) None Seen hpf    RBC, UA 5-10 (!) None Seen, 0-2 hpf    WBC, UA 10-25 (!) None Seen, 0-5 hpf    Squam Epithel, UA 25-50 (!) None Seen, 0-5 lpf       Assessment and Plan:   1.  UTI.  Patient left without being seen today.  Initial urinalysis was suspicious for UTI, but patient did not respond to my [employer] message regarding what symptoms she was having.  I opted to wait for the urine culture to come back before again attempting to contact patient.  Urine culture came back on Wednesday morning with E. coli.  Patient already has a prescription sent for Bactrim from her PCP.  Follow-up as needed.    This dictation uses voice recognition software, which may contain typographical errors.

## 2021-08-14 ENCOUNTER — HEALTH MAINTENANCE LETTER (OUTPATIENT)
Age: 36
End: 2021-08-14

## 2021-10-09 ENCOUNTER — HEALTH MAINTENANCE LETTER (OUTPATIENT)
Age: 36
End: 2021-10-09

## 2021-10-25 ENCOUNTER — OFFICE VISIT (OUTPATIENT)
Dept: FAMILY MEDICINE | Facility: CLINIC | Age: 36
End: 2021-10-25
Payer: COMMERCIAL

## 2021-10-25 VITALS
DIASTOLIC BLOOD PRESSURE: 52 MMHG | WEIGHT: 165 LBS | TEMPERATURE: 97.8 F | RESPIRATION RATE: 14 BRPM | BODY MASS INDEX: 29.23 KG/M2 | HEART RATE: 74 BPM | SYSTOLIC BLOOD PRESSURE: 94 MMHG | HEIGHT: 63 IN

## 2021-10-25 DIAGNOSIS — F90.2 ATTENTION DEFICIT HYPERACTIVITY DISORDER (ADHD), COMBINED TYPE: ICD-10-CM

## 2021-10-25 DIAGNOSIS — Z00.00 ANNUAL PHYSICAL EXAM: Primary | ICD-10-CM

## 2021-10-25 DIAGNOSIS — F60.3 BORDERLINE PERSONALITY DISORDER (H): ICD-10-CM

## 2021-10-25 DIAGNOSIS — Z30.46 NEXPLANON REMOVAL: ICD-10-CM

## 2021-10-25 DIAGNOSIS — Z30.46 ENCOUNTER FOR NEXPLANON REMOVAL: ICD-10-CM

## 2021-10-25 DIAGNOSIS — F41.9 ANXIETY: ICD-10-CM

## 2021-10-25 DIAGNOSIS — Z72.0 TOBACCO USE: ICD-10-CM

## 2021-10-25 DIAGNOSIS — F19.20 CHEMICAL DEPENDENCY (H): ICD-10-CM

## 2021-10-25 DIAGNOSIS — F43.10 PTSD (POST-TRAUMATIC STRESS DISORDER): ICD-10-CM

## 2021-10-25 DIAGNOSIS — F33.1 MODERATE EPISODE OF RECURRENT MAJOR DEPRESSIVE DISORDER (H): ICD-10-CM

## 2021-10-25 PROBLEM — O44.40 LOW LYING PLACENTA, ANTEPARTUM: Status: ACTIVE | Noted: 2017-09-06

## 2021-10-25 PROBLEM — O09.90 HIGH-RISK PREGNANCY, UNSPECIFIED TRIMESTER: Status: RESOLVED | Noted: 2017-11-08 | Resolved: 2021-10-25

## 2021-10-25 PROBLEM — F15.10 METHAMPHETAMINE ABUSE (H): Status: ACTIVE | Noted: 2017-06-06

## 2021-10-25 PROBLEM — G43.909 MIGRAINE HEADACHE: Status: ACTIVE | Noted: 2018-01-08

## 2021-10-25 PROBLEM — O44.40 LOW LYING PLACENTA, ANTEPARTUM: Status: RESOLVED | Noted: 2017-09-06 | Resolved: 2021-10-25

## 2021-10-25 PROBLEM — F15.10 METHAMPHETAMINE ABUSE (H): Status: RESOLVED | Noted: 2017-06-06 | Resolved: 2021-10-25

## 2021-10-25 PROCEDURE — 99385 PREV VISIT NEW AGE 18-39: CPT | Mod: 25 | Performed by: PHYSICIAN ASSISTANT

## 2021-10-25 PROCEDURE — 11982 REMOVE DRUG IMPLANT DEVICE: CPT | Performed by: PHYSICIAN ASSISTANT

## 2021-10-25 PROCEDURE — 87624 HPV HI-RISK TYP POOLED RSLT: CPT | Performed by: PHYSICIAN ASSISTANT

## 2021-10-25 PROCEDURE — G0123 SCREEN CERV/VAG THIN LAYER: HCPCS | Performed by: PHYSICIAN ASSISTANT

## 2021-10-25 RX ORDER — LIDOCAINE HYDROCHLORIDE AND EPINEPHRINE 10; 10 MG/ML; UG/ML
2.5 INJECTION, SOLUTION INFILTRATION; PERINEURAL ONCE
Status: DISCONTINUED | OUTPATIENT
Start: 2021-10-25 | End: 2022-08-14

## 2021-10-25 ASSESSMENT — MIFFLIN-ST. JEOR: SCORE: 1417.57

## 2021-10-25 ASSESSMENT — PATIENT HEALTH QUESTIONNAIRE - PHQ9: SUM OF ALL RESPONSES TO PHQ QUESTIONS 1-9: 14
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
## 2021-10-27 LAB
HUMAN PAPILLOMA VIRUS 16 DNA: NEGATIVE
HUMAN PAPILLOMA VIRUS 18 DNA: NEGATIVE
HUMAN PAPILLOMA VIRUS FINAL DIAGNOSIS: NORMAL
HUMAN PAPILLOMA VIRUS OTHER HR: NEGATIVE

## 2021-10-29 LAB
BKR LAB AP GYN ADEQUACY: NORMAL
BKR LAB AP GYN INTERPRETATION: NORMAL
BKR LAB AP HPV REFLEX: NORMAL
BKR LAB AP PREVIOUS ABNORMAL: NORMAL
PATH REPORT.COMMENTS IMP SPEC: NORMAL
PATH REPORT.RELEVANT HX SPEC: NORMAL

## 2021-12-23 ENCOUNTER — MYC MEDICAL ADVICE (OUTPATIENT)
Dept: FAMILY MEDICINE | Facility: CLINIC | Age: 36
End: 2021-12-23
Payer: COMMERCIAL

## 2021-12-23 NOTE — TELEPHONE ENCOUNTER
RN attempted to contact patient, but no answer. Left message on patient's voice mail to call clinic back.    Also sent patient a My Chart message to call clinic back and be triaged for tooth pain.    Vera Acosta RN  [facility]

## 2022-01-06 NOTE — TELEPHONE ENCOUNTER
RN made 2nd attempt to call patient regarding her Mychart message.    Patient did not answer. Left voicemail for patient to call the clinic back.          Kaley Tovar RN  [facility]

## 2022-01-07 ENCOUNTER — NURSE TRIAGE (OUTPATIENT)
Dept: FAMILY MEDICINE | Facility: CLINIC | Age: 37
End: 2022-01-07
Payer: COMMERCIAL

## 2022-01-07 NOTE — TELEPHONE ENCOUNTER
"Antibiotics will only help if she has an infection, and we don't know if she does.  She needs to see a dentist.  There are dentists who have \"emergency\" dental care, she could hopefully see one of them.  "

## 2022-01-07 NOTE — TELEPHONE ENCOUNTER
RN attempt to call pt regarding Allison's message below. No answer. RN left detailed VM and call back number.    If pt calls back, please reiterate Allison's message below to pt.    Thanks,    STAR PeraltaN, RN   [facility]

## 2022-01-07 NOTE — TELEPHONE ENCOUNTER
"Per pt, she has not had any recent dental work done.     Toothache is mostly located on left side on bottom. This toothache has started about 6 months ago. When taken ibuprofen pain goes away. Now, the pain is back and ibuprofen not working well.    Pain rated at 9/10. Pain occurs when trying to sleep. No visible swelling on face.    Pt denies any fever, no chest pain, no difficulty breathing, and no trouble swallowing.    No chance of pregnancy.    Right now, patient's toothache is rated at 3/10.     Pt states one of her \"filling on left side broke off.\" Now it is exposed.    Per Protocol: Call Dentist Today  Care Advice given to pt. Pt currently does NOT have a Dentist.    Per pt she is requesting ABX from provider then plans find a Dental office to go to.     Pt call back number#  [phone]   OK to leave detailed VM    Routing to provider to review and advise.    STAR PeraltaN, RN   [facility]    Reason for Disposition    Lost filling    Additional Information    Negative: Pale cold skin and very weak (can't stand)    Negative: Similar pain previously and it was from 'heart attack'    Negative: Similar pain previously and it was from 'angina' and not relieved by nitroglycerin    Negative: Sounds like a life-threatening emergency to the triager    Negative: Chest pain    Negative: Toothache followed tooth injury    Negative: Face is swollen    Negative: Fever    Negative: SEVERE toothache pain    Protocols used: TOOTHACHE-A-OH      "

## 2022-02-17 PROBLEM — K21.9 GASTROESOPHAGEAL REFLUX DISEASE: Status: ACTIVE | Noted: 2017-11-08

## 2022-06-09 RX ORDER — RNA INGREDIENT BNT-162B2 0.23 G/1.8ML
INJECTION, SUSPENSION INTRAMUSCULAR
Status: ON HOLD | COMMUNITY
Start: 2021-12-01 | End: 2022-08-14

## 2022-06-14 ENCOUNTER — PRE VISIT (OUTPATIENT)
Dept: MATERNAL FETAL MEDICINE | Facility: CLINIC | Age: 37
End: 2022-06-14

## 2022-06-14 ENCOUNTER — TRANSCRIBE ORDERS (OUTPATIENT)
Dept: MATERNAL FETAL MEDICINE | Facility: HOSPITAL | Age: 37
End: 2022-06-14

## 2022-06-14 ENCOUNTER — PRENATAL OFFICE VISIT (OUTPATIENT)
Dept: MIDWIFE SERVICES | Facility: CLINIC | Age: 37
End: 2022-06-14
Payer: COMMERCIAL

## 2022-06-14 ENCOUNTER — LAB (OUTPATIENT)
Dept: LAB | Facility: CLINIC | Age: 37
End: 2022-06-14
Payer: COMMERCIAL

## 2022-06-14 VITALS
DIASTOLIC BLOOD PRESSURE: 68 MMHG | HEIGHT: 63 IN | SYSTOLIC BLOOD PRESSURE: 104 MMHG | HEART RATE: 104 BPM | BODY MASS INDEX: 33.89 KG/M2 | WEIGHT: 191.3 LBS

## 2022-06-14 DIAGNOSIS — Z87.898 HISTORY OF SUBSTANCE USE DISORDER: ICD-10-CM

## 2022-06-14 DIAGNOSIS — Z59.819 HOUSING SITUATION UNSTABLE: ICD-10-CM

## 2022-06-14 DIAGNOSIS — O09.529 SUPERVISION OF HIGH-RISK PREGNANCY OF ELDERLY MULTIGRAVIDA: ICD-10-CM

## 2022-06-14 DIAGNOSIS — O09.529 ANTEPARTUM MULTIGRAVIDA OF ADVANCED MATERNAL AGE: ICD-10-CM

## 2022-06-14 DIAGNOSIS — Z87.891 PERSONAL HISTORY OF TOBACCO USE, PRESENTING HAZARDS TO HEALTH: ICD-10-CM

## 2022-06-14 DIAGNOSIS — O09.529 SUPERVISION OF HIGH-RISK PREGNANCY OF ELDERLY MULTIGRAVIDA: Primary | ICD-10-CM

## 2022-06-14 DIAGNOSIS — O26.90 PREGNANCY RELATED CONDITION, ANTEPARTUM: Primary | ICD-10-CM

## 2022-06-14 DIAGNOSIS — Z86.59 HISTORY OF DEPRESSION: ICD-10-CM

## 2022-06-14 DIAGNOSIS — O09.30 INSUFFICIENT ANTEPARTUM CARE: ICD-10-CM

## 2022-06-14 PROBLEM — F19.20 CHEMICAL DEPENDENCY (H): Status: RESOLVED | Noted: 2017-11-08 | Resolved: 2022-06-14

## 2022-06-14 LAB
ABO/RH(D): NORMAL
AMPHETAMINES UR QL SCN: NORMAL
ANTIBODY SCREEN: NEGATIVE
BARBITURATES UR QL: NORMAL
BENZODIAZ UR QL: NORMAL
CANNABINOIDS UR QL SCN: NORMAL
CLUE CELLS: ABNORMAL
COCAINE UR QL: NORMAL
CREAT UR-MCNC: 44 MG/DL
GLUCOSE 1H P 50 G GLC PO SERPL-MCNC: 104 MG/DL (ref 70–129)
HIV 1+2 AB+HIV1 P24 AG SERPL QL IA: NEGATIVE
OPIATES UR QL SCN: NORMAL
OXYCODONE UR QL: NORMAL
PCP UR QL SCN: NORMAL
SPECIMEN EXPIRATION DATE: NORMAL
SPECIMEN EXPIRATION DATE: NORMAL
T PALLIDUM AB SER QL: NONREACTIVE
TRICHOMONAS, WET PREP: ABNORMAL
WBC'S/HIGH POWER FIELD, WET PREP: ABNORMAL
YEAST, WET PREP: ABNORMAL

## 2022-06-14 PROCEDURE — 87086 URINE CULTURE/COLONY COUNT: CPT | Performed by: MIDWIFE

## 2022-06-14 PROCEDURE — 86780 TREPONEMA PALLIDUM: CPT

## 2022-06-14 PROCEDURE — 99207 PR FIRST OB VISIT: CPT | Performed by: MIDWIFE

## 2022-06-14 PROCEDURE — 82950 GLUCOSE TEST: CPT

## 2022-06-14 PROCEDURE — 87210 SMEAR WET MOUNT SALINE/INK: CPT | Performed by: MIDWIFE

## 2022-06-14 PROCEDURE — 87491 CHLMYD TRACH DNA AMP PROBE: CPT | Performed by: MIDWIFE

## 2022-06-14 PROCEDURE — 86901 BLOOD TYPING SEROLOGIC RH(D): CPT

## 2022-06-14 PROCEDURE — 99204 OFFICE O/P NEW MOD 45 MIN: CPT | Performed by: MIDWIFE

## 2022-06-14 PROCEDURE — 87340 HEPATITIS B SURFACE AG IA: CPT

## 2022-06-14 PROCEDURE — 36415 COLL VENOUS BLD VENIPUNCTURE: CPT

## 2022-06-14 PROCEDURE — 87591 N.GONORRHOEAE DNA AMP PROB: CPT | Performed by: MIDWIFE

## 2022-06-14 PROCEDURE — 86762 RUBELLA ANTIBODY: CPT

## 2022-06-14 PROCEDURE — 86850 RBC ANTIBODY SCREEN: CPT

## 2022-06-14 PROCEDURE — 86803 HEPATITIS C AB TEST: CPT

## 2022-06-14 PROCEDURE — 87389 HIV-1 AG W/HIV-1&-2 AB AG IA: CPT

## 2022-06-14 PROCEDURE — 80307 DRUG TEST PRSMV CHEM ANLYZR: CPT | Performed by: MIDWIFE

## 2022-06-14 SDOH — ECONOMIC STABILITY - HOUSING INSECURITY: HOUSING INSTABILITY UNSPECIFIED: Z59.819
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
## 2022-06-15 DIAGNOSIS — R87.610 ASCUS OF CERVIX WITH NEGATIVE HIGH RISK HPV: Primary | ICD-10-CM

## 2022-06-15 LAB
C TRACH DNA SPEC QL PROBE+SIG AMP: NEGATIVE
HBV SURFACE AG SERPL QL IA: NONREACTIVE
HCV AB SERPL QL IA: NEGATIVE
N GONORRHOEA DNA SPEC QL NAA+PROBE: NEGATIVE
RUBV IGG SERPL QL IA: 1.33 INDEX
RUBV IGG SERPL QL IA: POSITIVE

## 2022-06-16 ENCOUNTER — PATIENT OUTREACH (OUTPATIENT)
Dept: NURSING | Facility: CLINIC | Age: 37
End: 2022-06-16

## 2022-06-16 LAB — BACTERIA UR CULT: NORMAL

## 2022-06-16 NOTE — PROGRESS NOTES
[facility]  Genetic Counseling Consult    Patient:  Cynthia Garcia YOB: 1985   Date of Service:  22      Cynthia Garcia was seen at the [facility] for genetic consultation as part of her appointment for comprehensive ultrasound.  The indication for genetic counseling is advanced maternal age. Cynthia was unaccompanied to the appointment today.         Impression/Plan:   1. Cynthia has declined serum screening in this pregnancy.    2. Cynthia had a comprehensive (level II) ultrasound today.  Please see the ultrasound report for further details.    3. The patient declines genetic amniocentesis and maternal serum screening today.     Pregnancy History:   /Parity:    Age at Delivery: 37 year old  CHARLENE: 2022, by Last Menstrual Period  Gestational Age: 28w1d    Cynthia was using methamphetamine until her positive pregnancy test at 8 weeks. She has been in recovery since that time. She is currently smoking 1/2 PPD.     No other significant complications or exposures were reported in the current pregnancy.    Cynthia bone pregnancy history is significant for:  o TAB x3  o : Term , male  o : Term , female  o 2009: Term, female  o 2015: Term, , female  o 2018:  36w1d , female    Medical History:   Cynthia bone reported medical history is not expected to impact pregnancy management or risks to fetal development.       Family History:   A three-generation pedigree was obtained, and is scanned under the  Media  tab.   The following significant findings were reported by Cynthia:    The father of the pregnancy, Phoenix, is 43 and has CKD stage V and hypertension.     Cynthia's 13 year old daughter with a previous partner has a history of febrile seizures as a child and was diagnosed with epilepsy. She has not had seizures in quite a while and does not currently take any medication for this.  Seizure disorders including epilepsy can be isolated or part of a broader genetic syndrome. When isolated, seizure disorders often result from multiple interacting genetic and environmental factors and follow a multifactorial pattern of inheritance. Given there is thought to be a genetic component and this is a second degree relative to the pregnancy, the risk for recurrence may be increased in the current pregnancy, however there is no prenatal test to determine if a fetus will develop a seizure disorder.     Cynthia's brother had an IEP in school for learning challenges, but he is able to live independently as an adult.     Cynthia's maternal aunt had infertility. She did have one healthy son but was otherwise unable to conceive. It is unknown if she experienced any miscarriages.     One of Phoenix's maternal half-sisters had twins, one of whom passed away. It is unknown if this twin passed away in utero or after birth.     Otherwise, the reported family history is negative for multiple miscarriages, stillbirths, birth defects, intellectual disability, known genetic conditions, and consanguinity.       Carrier Screening:   The patient reports that she and the father of the pregnancy have  ancestry:     Cystic fibrosis is an autosomal recessive genetic condition that occurs with increased frequency in individuals of  ancestry and carrier screening for this condition is available.  In addition,  screening in the [facility] includes cystic fibrosis.      Expanded carrier screening for mutations in a large panel of genes associated with autosomal recessive conditions including cystic fibrosis, spinal muscular atrophy, and others, is now available.      Carrier screening was not discussed today but remains available if Cynthia is interested at any point.       Risk Assessment for Chromosome Conditions:   We explained that the risk for fetal chromosome abnormalities increases with maternal  age. We discussed specific features of common chromosome abnormalities, including Down syndrome, trisomy 13, trisomy 18, and sex chromosome trisomies.      - At age 37 at midtrimester, the risk to have a baby with Down syndrome is 1 in 274.     - At age 37 at midtrimester, the risk to have a baby with any chromosome abnormality is 1 in 135.       Cynthia did not have maternal serum screening earlier in pregnancy.    Testing Options:   We discussed the following options:   Non-invasive Prenatal Testing (NIPT)    Maternal plasma cell-free DNA testing; first trimester ultrasound with nuchal translucency and nasal bone assessment is recommended, when appropriate    Screens for fetal trisomy 21, trisomy 13, trisomy 18, and sex chromosome aneuploidy    Cannot screen for open neural tube defects; maternal serum AFP after 15 weeks is recommended     Genetic Amniocentesis    Invasive procedure typically performed in the second trimester by which amniotic fluid is obtained for the purpose of chromosome analysis and/or other prenatal genetic analysis    Diagnostic results; >99% sensitivity for fetal chromosome abnormalities    AFAFP measurement tests for open neural tube defects     Comprehensive (Level II) ultrasound: Detailed ultrasound performed between 18-22 weeks gestation to screen for major birth defects and markers for aneuploidy.    We reviewed the benefits and limitations of this testing.  Screening tests provide a risk assessment specific to the pregnancy for certain fetal chromosome abnormalities, but cannot definitively diagnose or exclude a fetal chromosome abnormality.  Follow-up genetic counseling and consideration of diagnostic testing is recommended with any abnormal screening result.     Diagnostic tests carry inherent risks- including risk of miscarriage- that require careful consideration.  These tests can detect fetal chromosome abnormalities with greater than 99% certainty.  Results can be compromised by  maternal cell contamination or mosaicism, and are limited by the resolution of cytogenetic G-banding technology.  There is no screening nor diagnostic test that can detect all forms of birth defects or mental disability.    It was a pleasure to be involved with Cynthia s care. Face-to-face time of the meeting was 30 minutes.      Ama Ahmadi [facility], [facility]  Licensed Genetic Counselor  [facility]  Maternal Fetal Medicine  Phone: [phone]  [email or web address]

## 2022-06-16 NOTE — PROGRESS NOTES
"Clinic Care Coordination Contact  [facility]/Voicemail    Reason: CCC CHW Initial Outreach (Referral to Clinic Care Coordination Service)  Referral provider: Allison Larson PA-C  Note attached:   \"Housing instability.\"    Clinical Data: Care Coordinator Outreach:  Outreach attempted x 1: Patient was identified as a potential candidate and referred to Clinic Care Coordination Service. CHW attempted to connect with patient and no answer. Left message on patient's voicemail with call back information and requested return call.  Plan: Care Coordinator will try to reach patient again in 1-2 business days.      "

## 2022-06-17 ENCOUNTER — HOSPITAL ENCOUNTER (OUTPATIENT)
Dept: ULTRASOUND IMAGING | Facility: CLINIC | Age: 37
Discharge: HOME | End: 2022-06-17
Attending: MIDWIFE
Payer: COMMERCIAL

## 2022-06-17 ENCOUNTER — PATIENT OUTREACH (OUTPATIENT)
Dept: NURSING | Facility: CLINIC | Age: 37
End: 2022-06-17
Payer: COMMERCIAL

## 2022-06-17 ENCOUNTER — OFFICE VISIT (OUTPATIENT)
Dept: MATERNAL FETAL MEDICINE | Facility: CLINIC | Age: 37
End: 2022-06-17
Attending: MIDWIFE
Payer: COMMERCIAL

## 2022-06-17 DIAGNOSIS — Z36.9 ANTENATAL SCREENING ENCOUNTER: ICD-10-CM

## 2022-06-17 DIAGNOSIS — O26.90 PREGNANCY RELATED CONDITION, ANTEPARTUM: ICD-10-CM

## 2022-06-17 DIAGNOSIS — O09.523 MULTIGRAVIDA OF ADVANCED MATERNAL AGE IN THIRD TRIMESTER: Primary | ICD-10-CM

## 2022-06-17 PROCEDURE — 76811 OB US DETAILED SNGL FETUS: CPT | Mod: 26 | Performed by: OBSTETRICS & GYNECOLOGY

## 2022-06-17 PROCEDURE — 96040 HC GENETIC COUNSELING, EACH 30 MINUTES: CPT

## 2022-06-17 PROCEDURE — 76811 OB US DETAILED SNGL FETUS: CPT

## 2022-06-17 NOTE — PROGRESS NOTES
"Clinic Care Coordination Contact  [facility]/Voicemail    Reason: CCC CHW Initial Outreach (Referral to Clinic Care Coordination Service)  Referral provider: Allison Larson PA-C  Note attached:   \"Housing instability.\"     Clinical Data: Care Coordinator Outreach:  Outreach attempted x 2: Patient was identified as a potential candidate and referred to Clinic Care Coordination Service. CHW attempted to connect with patient and no answer. Left message on patient's voicemail with call back information and requested return call.  Plan: Care Coordinator will send care coordination introduction letter with care coordinator contact information and explanation of care coordination services via [email or web address]. Care Coordinator will do no further outreaches at this time.      "

## 2022-06-17 NOTE — PROGRESS NOTES
Please see full imaging report from [employer] program under imaging tab.    Ryan Mcelroy MD  Maternal Fetal Medicine

## 2022-06-17 NOTE — LETTER
[facility] CARE COORDINATION  [address] [facility] [address]  June 17, 2022    Cynthia Garcia  [address]  [place] [address]      Dear Cynthia,    I am a clinic community health worker who works with Allison Larson PA-C with the [facility]. I have been trying to reach you recently to introduce Clinic Care Coordination. Below is a description of clinic care coordination and how I can further assist you.       The clinic care coordination team is made up of a registered nurse, , financial resource worker and community health worker who understand the health care system. The goal of clinic care coordination is to help you manage your health and improve access to the health care system. Our team works alongside your provider to assist you in determining your health and social needs. We can help you obtain health care and community resources, providing you with necessary information and education. We can work with you through any barriers and develop a care plan that helps coordinate and strengthen the communication between you and your care team.    Please feel free to contact me at [phone] with any questions or concerns regarding care coordination and what we can offer. Please feel free to contact [facility] at [phone] with any other questions.        We are focused on providing you with the highest-quality healthcare experience possible. Thank you.        Sincerely,   Vero Bowser, CCC CHW

## 2022-06-21 ENCOUNTER — MYC MEDICAL ADVICE (OUTPATIENT)
Dept: MIDWIFE SERVICES | Facility: CLINIC | Age: 37
End: 2022-06-21

## 2022-08-14 ENCOUNTER — HOSPITAL ENCOUNTER (OUTPATIENT)
Facility: CLINIC | Age: 37
Discharge: HOME | End: 2022-08-14
Attending: ADVANCED PRACTICE MIDWIFE | Admitting: ADVANCED PRACTICE MIDWIFE
Payer: COMMERCIAL

## 2022-08-14 ENCOUNTER — HOSPITAL ENCOUNTER (OUTPATIENT)
Facility: CLINIC | Age: 37
End: 2022-08-14
Admitting: ADVANCED PRACTICE MIDWIFE
Payer: COMMERCIAL

## 2022-08-14 VITALS — TEMPERATURE: 97.7 F | RESPIRATION RATE: 16 BRPM

## 2022-08-14 PROBLEM — Z36.89 ENCOUNTER FOR TRIAGE IN PREGNANT PATIENT: Status: ACTIVE | Noted: 2022-08-14

## 2022-08-14 PROCEDURE — G0463 HOSPITAL OUTPT CLINIC VISIT: HCPCS

## 2022-08-14 RX ORDER — LIDOCAINE 40 MG/G
CREAM TOPICAL
Status: DISCONTINUED | OUTPATIENT
Start: 2022-08-14 | End: 2022-08-14 | Stop reason: HOSPADM

## 2022-08-14 NOTE — PROGRESS NOTES
Pt to [facility] with complaints of uterine cramping. Pt denies any LOF or VB. States + FM. Pt states she has not been checked in the clinic recently but would like to make sure she is not in labor now.

## 2022-08-14 NOTE — PROGRESS NOTES
"Pt unhooked herself from the monitor and was found walking down hallway alone. Pt states \"It must be a false alarm, I'm just going to go.\" Pt was asked if she was willing to return to her room as we were ready to assess her. Pt denied. She  was then asked to sign discharge paperwork before leaving and was agreeable to doing so. When I went around to nurses station patient got on elevator and left without signing any paperwork.  "
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
## 2022-08-31 ENCOUNTER — HOSPITAL ENCOUNTER (INPATIENT)
Facility: CLINIC | Age: 37
LOS: 1 days | Discharge: HOME | End: 2022-09-02
Attending: OBSTETRICS & GYNECOLOGY | Admitting: OBSTETRICS & GYNECOLOGY
Payer: COMMERCIAL

## 2022-08-31 RX ORDER — LIDOCAINE 40 MG/G
CREAM TOPICAL
Status: DISCONTINUED | OUTPATIENT
Start: 2022-08-31 | End: 2022-09-01 | Stop reason: HOSPADM

## 2022-08-31 RX ORDER — OMEPRAZOLE 10 MG/1
20 CAPSULE, DELAYED RELEASE ORAL DAILY
COMMUNITY

## 2022-08-31 ASSESSMENT — ACTIVITIES OF DAILY LIVING (ADL): ADLS_ACUITY_SCORE: 35

## 2022-09-01 ENCOUNTER — ANESTHESIA EVENT (OUTPATIENT)
Dept: OBGYN | Facility: CLINIC | Age: 37
End: 2022-09-01
Payer: COMMERCIAL

## 2022-09-01 ENCOUNTER — ANESTHESIA (OUTPATIENT)
Dept: OBGYN | Facility: CLINIC | Age: 37
End: 2022-09-01
Payer: COMMERCIAL

## 2022-09-01 PROBLEM — O47.9 UTERINE CONTRACTIONS: Status: ACTIVE | Noted: 2022-09-01

## 2022-09-01 LAB
ABO/RH(D): NORMAL
AMPHETAMINES UR QL SCN: NORMAL
ANTIBODY SCREEN: NEGATIVE
BARBITURATES UR QL: NORMAL
BENZODIAZ UR QL: NORMAL
CANNABINOIDS UR QL SCN: NORMAL
COCAINE UR QL: NORMAL
CREAT UR-MCNC: 46 MG/DL
HGB BLD-MCNC: 12 G/DL (ref 11.7–15.7)
METHADONE UR QL SCN: NORMAL
OPIATES UR QL SCN: NORMAL
OXYCODONE UR QL: NORMAL
PCP UR QL SCN: NORMAL
SARS-COV-2 RNA RESP QL NAA+PROBE: NEGATIVE
SPECIMEN EXPIRATION DATE: NORMAL
T PALLIDUM AB SER QL: NONREACTIVE

## 2022-09-01 PROCEDURE — 86901 BLOOD TYPING SEROLOGIC RH(D): CPT

## 2022-09-01 PROCEDURE — 258N000003 HC RX IP 258 OP 636

## 2022-09-01 PROCEDURE — 250N000011 HC RX IP 250 OP 636: Performed by: OBSTETRICS & GYNECOLOGY

## 2022-09-01 PROCEDURE — U0005 INFEC AGEN DETEC AMPLI PROBE: HCPCS | Performed by: OBSTETRICS & GYNECOLOGY

## 2022-09-01 PROCEDURE — 250N000009 HC RX 250

## 2022-09-01 PROCEDURE — 722N000001 HC LABOR CARE VAGINAL DELIVERY SINGLE

## 2022-09-01 PROCEDURE — 00HU33Z INSERTION OF INFUSION DEVICE INTO SPINAL CANAL, PERCUTANEOUS APPROACH: ICD-10-PCS | Performed by: ANESTHESIOLOGY

## 2022-09-01 PROCEDURE — 999N000157 HC STATISTIC RCP TIME EA 10 MIN

## 2022-09-01 PROCEDURE — 80307 DRUG TEST PRSMV CHEM ANLYZR: CPT

## 2022-09-01 PROCEDURE — 250N000013 HC RX MED GY IP 250 OP 250 PS 637: Performed by: OBSTETRICS & GYNECOLOGY

## 2022-09-01 PROCEDURE — 85018 HEMOGLOBIN: CPT

## 2022-09-01 PROCEDURE — 370N000003 HC ANESTHESIA WARD SERVICE

## 2022-09-01 PROCEDURE — 10907ZC DRAINAGE OF AMNIOTIC FLUID, THERAPEUTIC FROM PRODUCTS OF CONCEPTION, VIA NATURAL OR ARTIFICIAL OPENING: ICD-10-PCS | Performed by: OBSTETRICS & GYNECOLOGY

## 2022-09-01 PROCEDURE — 250N000011 HC RX IP 250 OP 636

## 2022-09-01 PROCEDURE — C9803 HOPD COVID-19 SPEC COLLECT: HCPCS

## 2022-09-01 PROCEDURE — 250N000011 HC RX IP 250 OP 636: Performed by: ANESTHESIOLOGY

## 2022-09-01 PROCEDURE — 999N000016 HC STATISTIC ATTENDANCE AT DELIVERY

## 2022-09-01 PROCEDURE — 120N000001 HC R&B MED SURG/OB

## 2022-09-01 PROCEDURE — 0KQM0ZZ REPAIR PERINEUM MUSCLE, OPEN APPROACH: ICD-10-PCS | Performed by: OBSTETRICS & GYNECOLOGY

## 2022-09-01 PROCEDURE — 3E0R3BZ INTRODUCTION OF ANESTHETIC AGENT INTO SPINAL CANAL, PERCUTANEOUS APPROACH: ICD-10-PCS | Performed by: ANESTHESIOLOGY

## 2022-09-01 PROCEDURE — 86780 TREPONEMA PALLIDUM: CPT

## 2022-09-01 PROCEDURE — 250N000009 HC RX 250: Performed by: ANESTHESIOLOGY

## 2022-09-01 RX ORDER — NALOXONE HYDROCHLORIDE 0.4 MG/ML
0.4 INJECTION, SOLUTION INTRAMUSCULAR; INTRAVENOUS; SUBCUTANEOUS
Status: DISCONTINUED | OUTPATIENT
Start: 2022-09-01 | End: 2022-09-01 | Stop reason: HOSPADM

## 2022-09-01 RX ORDER — KETOROLAC TROMETHAMINE 30 MG/ML
30 INJECTION, SOLUTION INTRAMUSCULAR; INTRAVENOUS
Status: DISCONTINUED | OUTPATIENT
Start: 2022-09-01 | End: 2022-09-02 | Stop reason: HOSPADM

## 2022-09-01 RX ORDER — OXYTOCIN 10 [USP'U]/ML
10 INJECTION, SOLUTION INTRAMUSCULAR; INTRAVENOUS
Status: COMPLETED | OUTPATIENT
Start: 2022-09-01 | End: 2022-09-01

## 2022-09-01 RX ORDER — METOCLOPRAMIDE 10 MG/1
10 TABLET ORAL EVERY 6 HOURS PRN
Status: DISCONTINUED | OUTPATIENT
Start: 2022-09-01 | End: 2022-09-01 | Stop reason: HOSPADM

## 2022-09-01 RX ORDER — KETOROLAC TROMETHAMINE 30 MG/ML
INJECTION, SOLUTION INTRAMUSCULAR; INTRAVENOUS
Status: DISCONTINUED
Start: 2022-09-01 | End: 2022-09-01 | Stop reason: WASHOUT

## 2022-09-01 RX ORDER — IBUPROFEN 800 MG/1
800 TABLET, FILM COATED ORAL
Status: DISCONTINUED | OUTPATIENT
Start: 2022-09-01 | End: 2022-09-02 | Stop reason: HOSPADM

## 2022-09-01 RX ORDER — DOCUSATE SODIUM 100 MG/1
100 CAPSULE, LIQUID FILLED ORAL DAILY
Status: DISCONTINUED | OUTPATIENT
Start: 2022-09-01 | End: 2022-09-02 | Stop reason: HOSPADM

## 2022-09-01 RX ORDER — MISOPROSTOL 200 UG/1
400 TABLET ORAL
Status: DISCONTINUED | OUTPATIENT
Start: 2022-09-01 | End: 2022-09-02 | Stop reason: HOSPADM

## 2022-09-01 RX ORDER — MISOPROSTOL 200 UG/1
400 TABLET ORAL
Status: DISCONTINUED | OUTPATIENT
Start: 2022-09-01 | End: 2022-09-01 | Stop reason: HOSPADM

## 2022-09-01 RX ORDER — NALBUPHINE HYDROCHLORIDE 10 MG/ML
2.5-5 INJECTION, SOLUTION INTRAMUSCULAR; INTRAVENOUS; SUBCUTANEOUS EVERY 6 HOURS PRN
Status: DISCONTINUED | OUTPATIENT
Start: 2022-09-01 | End: 2022-09-02 | Stop reason: HOSPADM

## 2022-09-01 RX ORDER — OXYTOCIN/0.9 % SODIUM CHLORIDE 30/500 ML
340 PLASTIC BAG, INJECTION (ML) INTRAVENOUS CONTINUOUS PRN
Status: DISCONTINUED | OUTPATIENT
Start: 2022-09-01 | End: 2022-09-02 | Stop reason: HOSPADM

## 2022-09-01 RX ORDER — NALOXONE HYDROCHLORIDE 0.4 MG/ML
0.2 INJECTION, SOLUTION INTRAMUSCULAR; INTRAVENOUS; SUBCUTANEOUS
Status: DISCONTINUED | OUTPATIENT
Start: 2022-09-01 | End: 2022-09-01 | Stop reason: HOSPADM

## 2022-09-01 RX ORDER — CITRIC ACID/SODIUM CITRATE 334-500MG
30 SOLUTION, ORAL ORAL
Status: DISCONTINUED | OUTPATIENT
Start: 2022-09-01 | End: 2022-09-01 | Stop reason: HOSPADM

## 2022-09-01 RX ORDER — OXYTOCIN/0.9 % SODIUM CHLORIDE 30/500 ML
340 PLASTIC BAG, INJECTION (ML) INTRAVENOUS CONTINUOUS PRN
Status: DISCONTINUED | OUTPATIENT
Start: 2022-09-01 | End: 2022-09-01 | Stop reason: HOSPADM

## 2022-09-01 RX ORDER — OXYTOCIN 10 [USP'U]/ML
10 INJECTION, SOLUTION INTRAMUSCULAR; INTRAVENOUS
Status: DISCONTINUED | OUTPATIENT
Start: 2022-09-01 | End: 2022-09-02 | Stop reason: HOSPADM

## 2022-09-01 RX ORDER — ACETAMINOPHEN 325 MG/1
650 TABLET ORAL EVERY 4 HOURS PRN
Status: DISCONTINUED | OUTPATIENT
Start: 2022-09-01 | End: 2022-09-02 | Stop reason: HOSPADM

## 2022-09-01 RX ORDER — METHYLERGONOVINE MALEATE 0.2 MG/ML
200 INJECTION INTRAVENOUS
Status: DISCONTINUED | OUTPATIENT
Start: 2022-09-01 | End: 2022-09-01 | Stop reason: HOSPADM

## 2022-09-01 RX ORDER — CARBOPROST TROMETHAMINE 250 UG/ML
250 INJECTION, SOLUTION INTRAMUSCULAR
Status: DISCONTINUED | OUTPATIENT
Start: 2022-09-01 | End: 2022-09-01 | Stop reason: HOSPADM

## 2022-09-01 RX ORDER — OXYTOCIN/0.9 % SODIUM CHLORIDE 30/500 ML
100-340 PLASTIC BAG, INJECTION (ML) INTRAVENOUS CONTINUOUS PRN
Status: DISCONTINUED | OUTPATIENT
Start: 2022-09-01 | End: 2022-09-02 | Stop reason: HOSPADM

## 2022-09-01 RX ORDER — MISOPROSTOL 200 UG/1
800 TABLET ORAL
Status: DISCONTINUED | OUTPATIENT
Start: 2022-09-01 | End: 2022-09-01 | Stop reason: HOSPADM

## 2022-09-01 RX ORDER — ONDANSETRON 4 MG/1
4 TABLET, ORALLY DISINTEGRATING ORAL EVERY 6 HOURS PRN
Status: DISCONTINUED | OUTPATIENT
Start: 2022-09-01 | End: 2022-09-01 | Stop reason: HOSPADM

## 2022-09-01 RX ORDER — HYDROCORTISONE 25 MG/G
CREAM TOPICAL 3 TIMES DAILY PRN
Status: DISCONTINUED | OUTPATIENT
Start: 2022-09-01 | End: 2022-09-02 | Stop reason: HOSPADM

## 2022-09-01 RX ORDER — METOCLOPRAMIDE HYDROCHLORIDE 5 MG/ML
10 INJECTION INTRAMUSCULAR; INTRAVENOUS EVERY 6 HOURS PRN
Status: DISCONTINUED | OUTPATIENT
Start: 2022-09-01 | End: 2022-09-01 | Stop reason: HOSPADM

## 2022-09-01 RX ORDER — CARBOPROST TROMETHAMINE 250 UG/ML
250 INJECTION, SOLUTION INTRAMUSCULAR
Status: DISCONTINUED | OUTPATIENT
Start: 2022-09-01 | End: 2022-09-02 | Stop reason: HOSPADM

## 2022-09-01 RX ORDER — MODIFIED LANOLIN
OINTMENT (GRAM) TOPICAL
Status: DISCONTINUED | OUTPATIENT
Start: 2022-09-01 | End: 2022-09-02 | Stop reason: HOSPADM

## 2022-09-01 RX ORDER — FENTANYL CITRATE-0.9 % NACL/PF 10 MCG/ML
100 PLASTIC BAG, INJECTION (ML) INTRAVENOUS EVERY 5 MIN PRN
Status: DISCONTINUED | OUTPATIENT
Start: 2022-09-01 | End: 2022-09-01 | Stop reason: HOSPADM

## 2022-09-01 RX ORDER — BISACODYL 10 MG
10 SUPPOSITORY, RECTAL RECTAL DAILY PRN
Status: DISCONTINUED | OUTPATIENT
Start: 2022-09-01 | End: 2022-09-02 | Stop reason: HOSPADM

## 2022-09-01 RX ORDER — PROCHLORPERAZINE MALEATE 10 MG
10 TABLET ORAL EVERY 6 HOURS PRN
Status: DISCONTINUED | OUTPATIENT
Start: 2022-09-01 | End: 2022-09-01 | Stop reason: HOSPADM

## 2022-09-01 RX ORDER — MISOPROSTOL 200 UG/1
800 TABLET ORAL
Status: DISCONTINUED | OUTPATIENT
Start: 2022-09-01 | End: 2022-09-02 | Stop reason: HOSPADM

## 2022-09-01 RX ORDER — ONDANSETRON 2 MG/ML
4 INJECTION INTRAMUSCULAR; INTRAVENOUS EVERY 6 HOURS PRN
Status: DISCONTINUED | OUTPATIENT
Start: 2022-09-01 | End: 2022-09-01 | Stop reason: HOSPADM

## 2022-09-01 RX ORDER — IBUPROFEN 800 MG/1
800 TABLET, FILM COATED ORAL EVERY 6 HOURS PRN
Status: DISCONTINUED | OUTPATIENT
Start: 2022-09-01 | End: 2022-09-02 | Stop reason: HOSPADM

## 2022-09-01 RX ORDER — LIDOCAINE 40 MG/G
CREAM TOPICAL
Status: DISCONTINUED | OUTPATIENT
Start: 2022-09-01 | End: 2022-09-01 | Stop reason: HOSPADM

## 2022-09-01 RX ORDER — ACETAMINOPHEN 325 MG/1
650 TABLET ORAL EVERY 4 HOURS PRN
Status: DISCONTINUED | OUTPATIENT
Start: 2022-09-01 | End: 2022-09-01 | Stop reason: HOSPADM

## 2022-09-01 RX ORDER — METHYLERGONOVINE MALEATE 0.2 MG/ML
200 INJECTION INTRAVENOUS
Status: DISCONTINUED | OUTPATIENT
Start: 2022-09-01 | End: 2022-09-02 | Stop reason: HOSPADM

## 2022-09-01 RX ORDER — PROCHLORPERAZINE 25 MG
25 SUPPOSITORY, RECTAL RECTAL EVERY 12 HOURS PRN
Status: DISCONTINUED | OUTPATIENT
Start: 2022-09-01 | End: 2022-09-01 | Stop reason: HOSPADM

## 2022-09-01 RX ADMIN — IBUPROFEN 800 MG: 800 TABLET ORAL at 11:55

## 2022-09-01 RX ADMIN — DOCUSATE SODIUM 100 MG: 100 CAPSULE, LIQUID FILLED ORAL at 11:55

## 2022-09-01 RX ADMIN — Medication 340 ML/HR: at 04:20

## 2022-09-01 RX ADMIN — ACETAMINOPHEN 650 MG: 325 TABLET, FILM COATED ORAL at 17:55

## 2022-09-01 RX ADMIN — ACETAMINOPHEN 650 MG: 325 TABLET, FILM COATED ORAL at 23:52

## 2022-09-01 RX ADMIN — IBUPROFEN 800 MG: 800 TABLET ORAL at 23:52

## 2022-09-01 RX ADMIN — ACETAMINOPHEN 650 MG: 325 TABLET, FILM COATED ORAL at 11:55

## 2022-09-01 RX ADMIN — Medication: at 01:24

## 2022-09-01 RX ADMIN — SODIUM CHLORIDE, POTASSIUM CHLORIDE, SODIUM LACTATE AND CALCIUM CHLORIDE 1000 ML: 600; 310; 30; 20 INJECTION, SOLUTION INTRAVENOUS at 01:00

## 2022-09-01 RX ADMIN — IBUPROFEN 800 MG: 800 TABLET ORAL at 17:55

## 2022-09-01 RX ADMIN — Medication 10 ML: at 01:25

## 2022-09-01 RX ADMIN — SODIUM CHLORIDE, POTASSIUM CHLORIDE, SODIUM LACTATE AND CALCIUM CHLORIDE 500 ML: 600; 310; 30; 20 INJECTION, SOLUTION INTRAVENOUS at 01:31

## 2022-09-01 RX ADMIN — OXYTOCIN 10 UNITS: 10 INJECTION, SOLUTION INTRAMUSCULAR; INTRAVENOUS at 04:40

## 2022-09-01 RX ADMIN — OXYTOCIN 10 UNITS: 10 INJECTION, SOLUTION INTRAMUSCULAR; INTRAVENOUS at 04:53

## 2022-09-01 ASSESSMENT — ACTIVITIES OF DAILY LIVING (ADL)
ADLS_ACUITY_SCORE: 18
DIFFICULTY_COMMUNICATING: NO
DOING_ERRANDS_INDEPENDENTLY_DIFFICULTY: NO
DIFFICULTY_EATING/SWALLOWING: NO
ADLS_ACUITY_SCORE: 18
WEAR_GLASSES_OR_BLIND: NO
TOILETING_ISSUES: NO
ADLS_ACUITY_SCORE: 18
WALKING_OR_CLIMBING_STAIRS_DIFFICULTY: NO
ADLS_ACUITY_SCORE: 18
DRESSING/BATHING_DIFFICULTY: NO
CONCENTRATING,_REMEMBERING_OR_MAKING_DECISIONS_DIFFICULTY: NO
ADLS_ACUITY_SCORE: 18
ADLS_ACUITY_SCORE: 18
FALL_HISTORY_WITHIN_LAST_SIX_MONTHS: NO
CHANGE_IN_FUNCTIONAL_STATUS_SINCE_ONSET_OF_CURRENT_ILLNESS/INJURY: NO
ADLS_ACUITY_SCORE: 18
ADLS_ACUITY_SCORE: 18
HEARING_DIFFICULTY_OR_DEAF: NO

## 2022-09-01 NOTE — PROGRESS NOTES
"Patient called RN into the room around 0930 this morning and asked when she could leave. Asked if she could leave the unit to smoke.    RN did explain that she should stay the full 24 hours so that baby can get her testing done. Then explained that because her GBS was not drawn and was also not treated therefore that is up for the pediatrician to decide when baby discharges. Did explain the pathology of GBS and its potential affects on baby. At that point the patient was visably upset, and stated, \"that doesn't even make sense.\"  RN asked her what didn't make sense and asked if she wanted anymore information. She then stated, \"I just don't want to be here. I don't like hospitals. She said I only had to be here for 24hours yesterday.\"      "

## 2022-09-01 NOTE — PROGRESS NOTES
Pt reports that her contractions are becoming more uncomfortable. Plan to recheck SVE in 1 hour, pt agreeable with plan. Pt has her friend, Alfreda, here for support.

## 2022-09-01 NOTE — ANESTHESIA PROCEDURE NOTES
Epidural catheter Procedure Note    Pre-Procedure   Staff -        Anesthesiologist:  Harshal Velez MD       Performed By: anesthesiologist       Location: OB       Procedure Start/Stop Times: 9/1/2022 1:12 AM and 9/1/2022 1:31 AM       Pre-Anesthestic Checklist: patient identified, IV checked, risks and benefits discussed, informed consent, monitors and equipment checked, pre-op evaluation and at physician/surgeon's request  Timeout:       Correct Patient: Yes        Correct Procedure: Yes        Correct Site: Yes        Correct Position: Yes   Procedure Documentation  Procedure: epidural catheter       Diagnosis: Labor       Patient Position: sitting       Patient Prep/Sterile Barriers: sterile gloves, mask, patient draped       Skin prep: Chloraprep       Local skin infiltrated with mL of 1% lidocaine.        Insertion Site: L3-4. (midline approach).       Technique: LORT saline        ANDREA at 6 cm.       Needle Type: [employer] needle       Needle Gauge: 17.        Needle Length (Inches): 3.5        Catheter: 19 G.          Catheter threaded easily.           Threaded 12 cm at skin.         # of attempts: 1 and  # of redirects:     Assessment/Narrative         Paresthesias: No.       Test dose of 3 mL lidocaine 1.5% w/ 1:200,000 epinephrine at 01:22 CDT.         Test dose negative, 3 minutes after injection, for signs of intravascular, subdural, or intrathecal injection.       Insertion/Infusion Method: LORT saline       Aspiration negative for Heme or CSF via Epidural Catheter.    Medication(s) Administered   0.125% bupivacaine (Epidural) - EPIDURAL   10 mL - 9/1/2022 1:25:00 AM  Medication Administration Time: 9/1/2022 1:12 AM

## 2022-09-01 NOTE — PROGRESS NOTES
Labor Progress Note    Assessment/Plan  37 year old  at 39w0d gestational age admitted in active labor. S/p AROM at 0224 with meconium stained fluid. Epidural and rodriguez catheter now in place.    - Continue to monitor FHR   - Anticipate     Subjective  Lying in bed comfortably.  Denies headache, changes in vision, chest pain, or shortness of breath. Patient's friend Alfreda at bedside.     Per nursing, unable to palpate head on cervical exam at 0200.  On bedside ultrasound, patient was found to have distended bladder and she had about 1200 cc out with Rodriguez placement.  Subsequent AROM with meconium stained fluid.  Repeat cervical exam as below with head palpable.    Objective  Vital signs in last 24 hours  Temp:  [98.1  F (36.7  C)-98.3  F (36.8  C)] 98.1  F (36.7  C)  Pulse:  [100] 100  Resp:  [16] 16  BP: ()/(51-85) 123/58  SpO2:  [98 %-100 %] 98 %    Physical Exam  General: Lying in bed  Abd: Gravid, soft, nontender  Cervix: 4-5cm, 100% effaced, -2 station   FHR: Baseline 136/mod variability/+ accels/no decels; Category I tracing  Mitchell: Contractions irregular  Extremities: No peripheral edema    Pertinent Labs   Lab Results   Component Value Date    ABORH A POS 2022    HGB 12.0 2022   Urine Drug Screen unremarkable    Patient discussed with attending physician, Dr. Kwesi MITCHELL , who agrees with the plan.     María Elena Duran MD PGY1 2022  [facility]

## 2022-09-01 NOTE — L&D DELIVERY NOTE
OB Vaginal Delivery Note    Cynthia Garcia MRN# [id number]   Age: 37 year old YOB: 1985     GA: 39w0d  GP:   Labor Complications: None   EBL: 300  mL  Delivery QBL:    Delivery Type: Vaginal, Spontaneous   ROM to Delivery Time: (Delivered) Hours: 1 Minutes: 53  [place] Weight:     1 Minute 5 Minute 10 Minute   Apgar Totals: 7   9        VIKKI MARIE;VEENA SANTANAA     Delivery Details:  Cynthia Garcia, a 37 year old  female delivered a viable infant with apgars of 7  and 9 . Patient was fully dilated and pushing after 4 hours in active labor. Delivery was via vaginal, spontaneous  to a sterile field under epidural  anesthesia. Infant delivered in vertex  right  occiput  posterior  position. Anterior and posterior shoulders delivered without difficulty. The cord was clamped, cut twice and  three vessels were noted. Cord blood was obtained in routine fashion with the following disposition:  lab.      Cord complications: none   Placenta delivered at 2022  4:23 AM . Placental disposition was Hospital disposal . Fundal massage performed and fundus found to be firm.     Episiotomy: none    Perineum, vagina, cervix were inspected, and the following lacerations were noted:   Perineal lacerations: 2nd      Any lacerations were repaired in the usual fashion using 3-0 vicryl.    Excellent hemostasis was noted. Needle count correct. Infant and patient in delivery room in good and stable condition.      Kaci Garcia-Cynthia [[id number]]    Labor Event Times    Labor onset date: 22 Onset time: 12:00 AM   Dilation complete date: 22 Complete time:  3:56 AM   Start pushing date/time: 2022 0356      Antibiotics received during labor?: No     Rupture identifier: Sac 1  Rupture date/time: 22 0224   Rupture type: Artificial Rupture of Membranes  Fluid color: Meconium     Augmentation: AROM  Indications for augmentation: Ineffective Contraction Pattern  1:1 continuous labor  support provided by?: RN       Delivery/Placenta Date and Time    Delivery Date: 9/1/22 Delivery Time:  4:17 AM   Placenta Date/Time: 9/1/2022  4:23 AM  Oxytocin given at the time of delivery: after delivery of baby  Delivering clinician: Felisha Orosco MD   Other personnel present at delivery:  Provider Role   Sandhya Christianson RN Registered Nurse   Marge aMrtínez RN Registered Nurse         Apgars    Living status: Living   1 Minute 5 Minute 10 Minute 15 Minute 20 Minute   Skin color: 0  1       Heart rate: 2  2       Reflex irritability: 2  2       Muscle tone: 1  2       Respiratory effort: 2  2       Total: 7  9       Apgars assigned by: VINICIUS BOURNE RN     Cord    Cord Complications: None               Stem cell collection?: No       Labor Events and Shoulder Dystocia    Fetal Tracing Prior to Delivery: Category 2  Shoulder dystocia present?: Neg     Delivery (Maternal) (Provider to Complete) ([id number])    Episiotomy: None  Perineal lacerations: 2nd    Est. blood loss (mL): 300  Repair suture: 3-0 Vicryl  Genital tract inspection done: Pos     Blood Loss  Mother: Cynthia Garcia R #[id number]   Start of Mother's Information    Delivery Blood Loss  09/01/22 0000 - 09/01/22 0523    EBL (mL) Hospital Encounter 300 mL    Total  300 mL         End of Mother's Information  Mother: Cynthia Garcia #[id number]          Delivery - Provider to Complete ([id number])    Delivering clinician: Felisha Orosco MD  Attempted Delivery Types (Choose all that apply): Spontaneous Vaginal Delivery  Delivery Type (Choose the 1 that will go to the Birth History): Vaginal, Spontaneous                   Other personnel:  Provider Role   Sandhya Christianson RN Registered Nurse   Marge Martínez RN Registered Nurse                Placenta    Date/Time: 9/1/2022  4:23 AM  Removal: Manual Removal  Disposition: Hospital disposal           Anesthesia    Method: Epidural  Cervical dilation at placement: 4-7                 Presentation and Position    Presentation: Vertex    Position: Right Occiput Posterior               María Elena Duran MD

## 2022-09-01 NOTE — H&P
OBSTETRICS ADMISSION HISTORY & PHYSICAL  DATE OF ADMISSION: 2022  9:22 PM        Assessment and Plan:   Assessment:   Cynthia Garcia is a 37 year old  at 39w0d in active labor.     Patient Active Problem List   Diagnosis     Gastroesophageal reflux disease, esophagitis presence not specified     PTSD (post-traumatic stress disorder)     Borderline personality disorder (H)     Attention deficit hyperactivity disorder (ADHD), combined type     Anxiety     Moderate episode of recurrent major depressive disorder (H)     Tobacco use     Migraine headache     ASCUS of cervix with negative high risk HPV     Encounter for triage in pregnant patient     Uterine contractions         PLAN:   1. Admit - see IP orders, UDS given Hx, Hgb, PIV  2. Anticipate   3. GBS: unknown. No prophylaxis indicated  4. Comfort plan: Epidural  5. Will monitor labor progress along with RN and update attending physician Dr. Orosco    Patient discussed with attending physician, Dr. Kwesi MITCHELL , who agrees with the plan.     María Elena Duran MD PGY1 2022  [facility] Residency Program         Chief Complaint:     Contractions       History of Present Illness:     Cynthia Garcia is a 37 year old year old  at 39w0d confirmed by LMP. Patient had 1 prenatal appointment with nurse midwife at 27 weeks.    Presents to the [facility] for active labor management.      They report irregular contractions starting at 10pm , and occurring every 4-5 minutes. Theydenies fluid leakage. They denies bleeding per vagina. Fetal movement is normal and pain free.    Her prenatal course has been complicated by late establishment of prenatal care with only one prenatal visit at 27 weeks.  Patient has a history of substance use including prior methamphetamine use now in recovery since becoming pregnant.  Patient smokes 5 cigarettes a day    Ultrasound at 28 weeks with anterior placenta, no previa,  with cephalic presentation.    Prenatal labs   Lab Results   Component Value Date    AS Negative 2022    HEPBANG Nonreactive 2022            Obstetrical History:     OB History    Para Term  AB Living   9 5 4 1 3 5   SAB IAB Ectopic Multiple Live Births   0 0 0 0 5      # Outcome Date GA Lbr Carlitos/2nd Weight Sex Delivery Anes PTL Lv   9 Current            8  18 36w1d 09:07 / 00:07 1.944 kg (4 lb 4.6 oz) F Vag-Spont  N KEKE      Complications: Fetal Intolerance      Name: EDISON CESPEDES      Apgar1: 6  Apgar5: 7   7 Term 12/02/15 37w3d 01:40 / 00:23 2.92 kg (6 lb 7 oz) F Vag-Spont Local, Nitrous  KEKE      Complications: Fetal Intolerance      Name: Deepthi      Apgar1: 9  Apgar5: 9   6 AB 2013           5 Term 02/15/09 38w0d  3.033 kg (6 lb 11 oz) F    KEKE   4 Term 07 40w0d  3.6 kg (7 lb 15 oz) F Vag-Spont   KEKE      Birth Comments: Hx of abuse, meconium,       Name: Monique      Apgar1: 8  Apgar5: 9   3 Term    3.402 kg (7 lb 8 oz) M Vag-Spont   KEKE      Name: Abner Chatman AB            1 AB                       Immunzations:     Most Recent Immunizations   Administered Date(s) Administered     COVID-19,PF,Pfizer (12+ Yrs) 2021     FLU 6-35 months 10/18/2018     HPV Quadrivalent 2010     Hep B, Peds or Adolescent 12/15/1999     HepB 12/15/1999     HepB-Adult 2010     Influenza Vaccine IM > 6 months Valent IIV4 (Alfuria,Fluzone) 2018     MMR 02/15/2009     Pneumococcal 23 valent 2010     Td,adult,historic,unspecified 2019     Tdap (Adacel,Boostrix) 02/15/2009     Tdap (Adult) Unspecified Formulation 1999   Deferred Date(s) Deferred     Influenza Vaccine IM > 6 months Valent IIV4 (Alfuria,Fluzone) 2020            Past Medical History:     Past Medical History:   Diagnosis Date     Anxiety      ASCUS of cervix with negative high risk HPV 7/15/2009    7/15/09 ASCUS, neg HPV 8/18/10 ASCUS, neg HPV 17 ASCUS, neg HPV 18  NIL pap, neg HPV Above per Care Everywhere 10/25/21 NIL pap, neg HPV. Routine screening     Chemical dependency (H)      High-risk pregnancy, unspecified trimester 11/8/2017     Low lying placenta, antepartum 9/6/2017 9/6/17:  @ 18w1d:  Placenta 1.5 cm from os F/u scan needed to recheck     Methamphetamine abuse (H) 6/6/2017 9/6/17:  + UDS,  [facility] referral CPS report filed by Vera Linton            Past Surgical History:     Past Surgical History:   Procedure Laterality Date     DENTAL SURGERY      [place] teeth removed     ENT SURGERY      Tonsillectomy age 18     TONSILLECTOMY              Family History:     Family History   Problem Relation Age of Onset     Lung Cancer Mother         Smoker     Substance Abuse Mother      Diabetes Maternal Grandmother      Depression Maternal Grandmother      Anxiety Disorder Maternal Grandmother      Substance Abuse Father      Substance Abuse Brother      Depression Brother      Substance Abuse Brother      Depression Brother             Social History:   no alcohol use  Smokes 5 cigarettes a day.  History of methamphetamine use with abstinence since becoming pregnant         Medications:   Self Administer Medications: [facility]    omeprazole (PRILOSEC) 10 MG DR capsule, Take 20 mg by mouth daily  Prenatal Vit-Fe Fumarate-FA (PRENATAL VITAMINS PO),            Allergies:   Patient has no known allergies.         Review of Systems:   CONSTITUTIONAL: no fatigue, no unexpected change in weight  EYES: no acute vision problems or changes  RESP: no significant cough, no shortness of breath  CV: no chest pain, no palpitations, no new or worsening peripheral edema  GI: no nausea, no vomiting, no constipation, no diarrhea  : no frequency, no dysuria, no hematuria  NEURO: no weakness, no dizziness, no headaches  ENDOCRINE: no temperature intolerance, no skin/hair changes  PSYCHIATRIC: NEGATIVE for changes in mood or trouble with sleep         Physical Exam:  "  Vitals:   /65 (BP Location: Right arm, Patient Position: Semi-Berry's, Cuff Size: Adult Regular)   Pulse 100   Temp 98.3  F (36.8  C) (Oral)   Resp 16   Ht 1.588 m (5' 2.5\")   Wt 86.2 kg (190 lb)   LMP 12/02/2021 (Approximate)   BMI 34.20 kg/m    190 lbs 0 oz  Estimated body mass index is 34.2 kg/m  as calculated from the following:    Height as of this encounter: 1.588 m (5' 2.5\").    Weight as of this encounter: 86.2 kg (190 lb).    GEN: Awake, alert in no apparent distress   HEENT: grossly normal  RESPIRATORY: clear to auscultation bilaterally, no increased work of breathing  BACK:  no costovertebral angle tenderness   CARDIOVASCULAR: RRR, no murmur  ABDOMEN: gravid  PELVIC:  no fluid noted, no blood noted.   Cervix: 4-5/70/-2 per nursing  EXT:  no edema or calf tenderness    Confirmed VTX by Ultrasound? No, initial ultrasound with unstable lie, CE with sutures palpated    Electronic Fetal Monitoring:  Baseline rate normal  Variability moderate  Accelerations present  Decelerations not present    Assessment: Category I EFM interpretation suggests absence of concern for fetal metabolic acidemia at this time due to moderate variability    Uterine Activity irregular.    Strip reviewed on unit    NST interpretation:  Baseline rate 151 normal  Accelerations present  Decelerations not present  Interpretation: reactive    "

## 2022-09-01 NOTE — ANESTHESIA POSTPROCEDURE EVALUATION
Patient: Cynthia Garcia    Procedure: * No procedures listed *       Anesthesia Type:  Epidural    Note:     Postop Pain Control: Uneventful            Sign Out: Well controlled pain   PONV: No   Neuro/Psych: Uneventful            Sign Out: Acceptable/Baseline neuro status   Airway/Respiratory: Uneventful            Sign Out: Acceptable/Baseline resp. status   CV/Hemodynamics: Uneventful            Sign Out: Acceptable CV status   Other NRE: NONE   DID A NON-ROUTINE EVENT OCCUR? No           Last vitals:  Vitals:    09/01/22 0644 09/01/22 1030 09/01/22 1430   BP: 119/57 118/59 119/78   Pulse:  84 87   Resp:  17 17   Temp:  36.4  C (97.6  F) 36.9  C (98.5  F)   SpO2:  97% 97%       Electronically Signed By: Clement Morales MD  September 1, 2022  4:59 PM

## 2022-09-01 NOTE — ANESTHESIA PREPROCEDURE EVALUATION
Anesthesia Pre-Procedure Evaluation    Patient: Cynthia Garcia   MRN: [id number] : 1985        Procedure :           Past Medical History:   Diagnosis Date     Anxiety      ASCUS of cervix with negative high risk HPV 7/15/2009    7/15/09 ASCUS, neg HPV 8/18/10 ASCUS, neg HPV 17 ASCUS, neg HPV 18 NIL pap, neg HPV Above per Care Everywhere 10/25/21 NIL pap, neg HPV. Routine screening     Chemical dependency (H)      High-risk pregnancy, unspecified trimester 2017     Low lying placenta, antepartum 2017:  @ 18w1d:  Placenta 1.5 cm from os F/u scan needed to recheck     Methamphetamine abuse (H) 2017:  + UDS,  [facility] referral CPS report filed by Vera Linton      Past Surgical History:   Procedure Laterality Date     DENTAL SURGERY      [place] teeth removed     ENT SURGERY      Tonsillectomy age 18     TONSILLECTOMY        No Known Allergies   Social History     Tobacco Use     Smoking status: Current Every Day Smoker     Packs/day: 0.25     Years: 10.00     Pack years: 2.50     Types: Cigarettes     Smokeless tobacco: Never Used   Substance Use Topics     Alcohol use: No      Wt Readings from Last 1 Encounters:   22 86.2 kg (190 lb)        Anesthesia Evaluation   Pt has had prior anesthetic.     No history of anesthetic complications       ROS/MED HX  ENT/Pulmonary: Comment: smoker      Neurologic:  - neg neurologic ROS     Cardiovascular:  - neg cardiovascular ROS     METS/Exercise Tolerance:     Hematologic:  - neg hematologic  ROS     Musculoskeletal:       GI/Hepatic:     (+) GERD,     Renal/Genitourinary:       Endo:     (+) Obesity,     Psychiatric/Substance Use: Comment: Hx of meth use    (+) psychiatric history bipolar     Infectious Disease:       Malignancy:       Other:            Physical Exam    Airway          Neck ROM: full   Mouth opening: > 3 cm    Respiratory Devices and Support         Dental           Cardiovascular    cardiovascular exam normal          Pulmonary   pulmonary exam normal                OUTSIDE LABS:  CBC:   Lab Results   Component Value Date    HGB 12.0 09/01/2022     BMP: No results found for: NA, POTASSIUM, CHLORIDE, CO2, BUN, CR, GLC  COAGS: No results found for: PTT, INR, FIBR  POC: No results found for: BGM, HCG, HCGS  HEPATIC: No results found for: ALBUMIN, PROTTOTAL, ALT, AST, GGT, ALKPHOS, BILITOTAL, BILIDIRECT, JOSE ARMANDO  OTHER: No results found for: PH, LACT, A1C, ULISES, PHOS, MAG, LIPASE, AMYLASE, TSH, T4, T3, CRP, SED    Anesthesia Plan    ASA Status:  3      Anesthesia Type: Epidural.              Consents    Anesthesia Plan(s) and associated risks, benefits, and realistic alternatives discussed. Questions answered and patient/representative(s) expressed understanding.    - Discussed:     - Discussed with:  Patient         Postoperative Care            Comments:           neg OB ROS.       Harshal Velez MD

## 2022-09-01 NOTE — PROGRESS NOTES
Patient presents to [facility] for labor evaluation.  Patient states she has been having contractions since last night but they are very irregular.  Patient is a  at 38w 6d.  SVE / but presentation is not easily assessed.  Resident evaluated with US. Unstable lie.  Will continue to monitor.

## 2022-09-01 NOTE — CONSULTS
Integrative Therapy Consult    Healing PresenceYes  Essential Oils: Topical (EO/Topical Oil)     Gogo -  HC, Lavender Massage Oil - HC       Healing Music:       Breathwork:       Guided Imagery:       Acupressure:       Oshibori:       Energy Therapy:       Healing Touch:       Reiki:       Qi Gong:     Massage: Foot      Targeted Massage:    Sleep Promotion:       Other Therapy:       Intervention Reason: Edema     Pre and Post Session Scores: Patient Desires Treatment: yes                             Delivery:         Referrals:      Meg Muro

## 2022-09-01 NOTE — H&P
OB Triage Note      Assessment and Plan:     Cynthia Garcia is a 37 year old  at 38w6d in likely prodromal labor. Membranes are intact.  Irregular contractions on the monitor.  GBS status is unknown.  On bedside ultrasound found to have unstable lie of fetus.    Patient Active Problem List   Diagnosis     Gastroesophageal reflux disease, esophagitis presence not specified     PTSD (post-traumatic stress disorder)     Borderline personality disorder (H)     Attention deficit hyperactivity disorder (ADHD), combined type     Anxiety     Moderate episode of recurrent major depressive disorder (H)     Tobacco use     Migraine headache     ASCUS of cervix with negative high risk HPV     Encounter for triage in pregnant patient     -Observation  -Evaluate for cervical change  - If not admitted, schedule for OB clinic tomorrow 2022 for further evaluation given unstable lie of fetus and lack of prior prenatal care     Patient discussed with attending physician, Dr. Kwesi MITCHELL , who agrees with the plan.      María Elena Duran MD PGY1 2022  [facility] Family Medicine Residency Program       Subjective:     Cynthia Garcia is a  37 year old female at 38w6d with a current prenatal history significant for late establishment with prenatal care at 27 weeks and AMA who presents to OB triage for rule out labor.  Of note, patient was seen at [facility] last night with the onset of contractions.    Patient has been seen once by nurse midwife at 27 weeks and states that she was also seen at a free clinic around 14 weeks.  She does not have any other established prenatal care.    She reports irregular contractions starting at 10 pm 22, and occurring every 15-20 minutes. She denies fluid leakage. She denies bleeding per vagina. Fetal movement is normal and pain free.    Estimated Date of Delivery: Sep 8, 2022 Patient's last menstrual period was 2021 (approximate).    "  Her prenatal course has been complicated by late establishment of prenatal care with only one prenatal visit at 27 weeks.  Patient has a history of substance use including prior methamphetamine use now in recovery since becoming pregnant.  Patient smokes 5 cigarettes a day    Prenatal labs:   Lab Results   Component Value Date    AS Negative 06/14/2022    HEPBANG Nonreactive 06/14/2022          Review of Systems:   CONSTITUTIONAL: no fatigue, no unexpected change in weight  EYES: no acute vision problems or changes  ENT: no ear problems, no mouth problems, no throat problems  RESP: no significant cough, no shortness of breath  CV: no chest pain, no palpitations, no new or worsening peripheral edema  GI: no nausea, no vomiting, no constipation, no diarrhea  : no frequency, no dysuria, no hematuria  NEURO: no weakness, no dizziness, no headaches  PSYCHIATRIC: NEGATIVE for changes in mood or trouble with sleep         Physical Exam:   Vitals:   /65 (BP Location: Right arm, Patient Position: Semi-Berry's, Cuff Size: Adult Regular)   Pulse 100   Temp 98.3  F (36.8  C) (Oral)   Resp 16   Ht 1.588 m (5' 2.5\")   Wt 90.7 kg (200 lb)   LMP 12/02/2021 (Approximate)   BMI 36.00 kg/m    200 lbs 0 oz  Estimated body mass index is 36 kg/m  as calculated from the following:    Height as of this encounter: 1.588 m (5' 2.5\").    Weight as of this encounter: 90.7 kg (200 lb).    GEN: Awake, alert in no apparent distress   HEENT: grossly normal  RESPIRATORY: clear to auscultation bilaterally, no increased work of breathing  BACK:  no costovertebral angle tenderness   CARDIOVASCULAR: RRR, no murmur  ABDOMEN: gravid, bedside ultrasound with unstable lie  PELVIC:  no fluid noted, no blood noted.   Cervix: 2/50/-2  EXT:  no edema or calf tenderness    NST interpretation:  Baseline rate 145 normal  Accelerations present  Decelerations not present  Interpretation: reactive    Labs today:  No results found for any visits on " 08/31/22.

## 2022-09-01 NOTE — PROGRESS NOTES
Pt transferred to room 262 via wheelchair.     Report given to Nadine BOURNE RN at 0025. Care relinquished.

## 2022-09-02 VITALS
HEIGHT: 63 IN | RESPIRATION RATE: 17 BRPM | TEMPERATURE: 97.7 F | OXYGEN SATURATION: 98 % | SYSTOLIC BLOOD PRESSURE: 125 MMHG | DIASTOLIC BLOOD PRESSURE: 72 MMHG | WEIGHT: 190 LBS | BODY MASS INDEX: 33.66 KG/M2 | HEART RATE: 89 BPM

## 2022-09-02 PROBLEM — O47.9 UTERINE CONTRACTIONS: Status: RESOLVED | Noted: 2022-09-01 | Resolved: 2022-09-02

## 2022-09-02 PROBLEM — Z36.89 ENCOUNTER FOR TRIAGE IN PREGNANT PATIENT: Status: RESOLVED | Noted: 2022-08-14 | Resolved: 2022-09-02

## 2022-09-02 PROBLEM — O09.30 INSUFFICIENT PRENATAL CARE: Status: ACTIVE | Noted: 2022-09-02

## 2022-09-02 PROCEDURE — 250N000013 HC RX MED GY IP 250 OP 250 PS 637: Performed by: OBSTETRICS & GYNECOLOGY

## 2022-09-02 RX ORDER — IBUPROFEN 600 MG/1
600 TABLET, FILM COATED ORAL EVERY 6 HOURS PRN
Qty: 40 TABLET | Refills: 0 | Status: SHIPPED | OUTPATIENT
Start: 2022-09-02

## 2022-09-02 RX ORDER — ACETAMINOPHEN 500 MG
500-1000 TABLET ORAL EVERY 6 HOURS PRN
Qty: 60 TABLET | Refills: 0 | Status: SHIPPED | OUTPATIENT
Start: 2022-09-02

## 2022-09-02 RX ADMIN — DOCUSATE SODIUM 100 MG: 100 CAPSULE, LIQUID FILLED ORAL at 09:05

## 2022-09-02 RX ADMIN — IBUPROFEN 800 MG: 800 TABLET ORAL at 09:12

## 2022-09-02 RX ADMIN — ACETAMINOPHEN 650 MG: 325 TABLET, FILM COATED ORAL at 09:07

## 2022-09-02 ASSESSMENT — ACTIVITIES OF DAILY LIVING (ADL)
ADLS_ACUITY_SCORE: 18

## 2022-09-02 NOTE — PLAN OF CARE
"  Problem: Plan of Care - These are the overarching goals to be used throughout the patient stay.    Goal: Plan of Care Review/Shift Note  Description: The Plan of Care Review/Shift note should be completed every shift.  The Outcome Evaluation is a brief statement about your assessment that the patient is improving, declining, or no change.  This information will be displayed automatically on your shift note.  Outcome: Ongoing, Progressing  Goal: Patient-Specific Goal (Individualized)  Description: You can add care plan individualizations to a care plan. Examples of Individualization might be:  \"Parent requests to be called daily at 9am for status\", \"I have a hard time hearing out of my right ear\", or \"Do not touch me to wake me up as it startles me\".  Outcome: Ongoing, Progressing  Goal: Absence of Hospital-Acquired Illness or Injury  Outcome: Ongoing, Progressing  Goal: Optimal Comfort and Wellbeing  Outcome: Ongoing, Progressing  Intervention: Monitor Pain and Promote Comfort  Recent Flowsheet Documentation  Taken 9/2/2022 0011 by Madeleine Barrientos, RN  Pain Management Interventions:   declines   tub bath  Taken 9/1/2022 2000 by Madeleine Barrientos, RN  Pain Management Interventions:   declines   tub bath  Goal: Readiness for Transition of Care  Outcome: Ongoing, Progressing     Problem: Bleeding (Labor)  Goal: Hemostasis  Outcome: Ongoing, Progressing     Problem: Change in Fetal Wellbeing (Labor)  Goal: Stable Fetal Wellbeing  Outcome: Ongoing, Progressing     Problem: Delayed Labor Progression (Labor)  Goal: Effective Progression to Delivery  Outcome: Ongoing, Progressing     Problem: Infection (Labor)  Goal: Absence of Infection Signs and Symptoms  Outcome: Ongoing, Progressing     Problem: Labor Pain (Labor)  Goal: Acceptable Pain Control  Outcome: Ongoing, Progressing     Problem: Uterine Tachysystole (Labor)  Goal: Normal Uterine Contraction Pattern  Outcome: Ongoing, Progressing     Problem: Adjustment to Role " Transition (Postpartum Vaginal Delivery)  Goal: Successful Maternal Role Transition  Outcome: Ongoing, Progressing     Problem: Bleeding (Postpartum Vaginal Delivery)  Goal: Hemostasis  Outcome: Ongoing, Progressing     Problem: Infection (Postpartum Vaginal Delivery)  Goal: Absence of Infection Signs/Symptoms  Outcome: Ongoing, Progressing     Problem: Pain (Postpartum Vaginal Delivery)  Goal: Acceptable Pain Control  Outcome: Ongoing, Progressing  Intervention: Prevent or Manage Pain  Recent Flowsheet Documentation  Taken 9/2/2022 0011 by Madeleine Barrientos, RN  Pain Management Interventions:   declines   tub bath  Taken 9/1/2022 2000 by Madeleine Barrientos, RN  Pain Management Interventions:   declines   tub bath     Problem: Urinary Retention (Postpartum Vaginal Delivery)  Goal: Effective Urinary Elimination  Outcome: Ongoing, Progressing       PATIENT doing well, able to take three naps tonight. Would like to discharge today. Taking tylenol/ibuprofen for pain control. Vitals wnl, fundus wnl. Took a tub bath last night.

## 2022-09-02 NOTE — PROGRESS NOTES
"Patient did not wait for physician to discharge . Patient was out of room asking to leave. Spoke to physician, mother did not want to wait the 30 minutes for physician to arrive at hospital to assess before discharging . Mother informed of leaving AMA and signed paperwork. Patient refused discharge teaching. Writer walked patient and  to vehicle, child securely placed in care seat by mother prior to leaving. When leaving patient stated to nurse, \"sorry for being an asshole.\"     During assessment patient refused skin assessment and fundal check.   "

## 2022-09-04 ENCOUNTER — PATIENT OUTREACH (OUTPATIENT)
Dept: CARE COORDINATION | Facility: CLINIC | Age: 37
End: 2022-09-04

## 2022-09-04 NOTE — PROGRESS NOTES
"Clinic Care Coordination Contact  [facility]: Post-Discharge Note  SITUATION                                                      Admission:    Admission Date: 22   Reason for Admission: Contractions, Pregnancy  Discharge:   Discharge Date: 22  Discharge Diagnosis: Pregnancy    BACKGROUND                                                      Per hospital discharge summary and inpatient provider notes:    Cynthia Garcia is a 37 year old year old  at 39w0d confirmed by LMP. Patient had 1 prenatal appointment with nurse midwife at 27 weeks.     Presents to the [facility] for active labor management.       They report irregular contractions starting at 10pm , and occurring every 4-5 minutes. Theydenies fluid leakage. They denies bleeding per vagina. Fetal movement is normal and pain free.     Her prenatal course has been complicated by late establishment of prenatal care with only one prenatal visit at 27 weeks.  Patient has a history of substance use including prior methamphetamine use now in recovery since becoming pregnant.  Patient smokes 5 cigarettes a day     Ultrasound at 28 weeks with anterior placenta, no previa, with cephalic presentation.    ASSESSMENT      Discharge Assessment  How are you doing now that you are home?: \"Yeah I'm doing good.\"  How are your symptoms? (Red Flag symptoms escalate to triage hotline per guidelines): Improved  Do you feel your condition is stable enough to be safe at home until your provider visit?: Yes  Does the patient have their discharge instructions? : No - Review discharge instructions (No AVS available in Chart Review. [facility]  Does the patient have questions regarding their discharge instructions? : No  Were you started on any new medications or were there changes to any of your previous medications? : No  Does the patient have all of their medications?: Yes  Do you have questions regarding any of your " medications? : No  Do you have all of your needed medical supplies or equipment (DME)?  (i.e. oxygen tank, CPAP, cane, etc.): Yes  Discharge follow-up appointment scheduled within 14 calendar days? : No (Pt plans to contact their primary care clinic after the holiday weekend to schedule OB/GYN f/u appts.)  Is patient agreeable to assistance with scheduling? : Yes    Post-op (VARGASW CTA Only)  If the patient had a surgery or procedure, do they have any questions for a nurse?: No      PLAN                                                      Outpatient Plan:    N/A. No f/u recommendations found in chart review or notes.    No future appointments.      For any urgent concerns, please contact our 24 hour nurse triage line: [phone] ([phone])         CAL Dubon  [phone]  Connected Care Resource [facility]

## 2022-09-12 ENCOUNTER — MEDICAL CORRESPONDENCE (OUTPATIENT)
Dept: HEALTH INFORMATION MANAGEMENT | Facility: CLINIC | Age: 37
End: 2022-09-12

## 2022-09-17 ENCOUNTER — HEALTH MAINTENANCE LETTER (OUTPATIENT)
Age: 37
End: 2022-09-17

## 2023-06-11 ENCOUNTER — HOSPITAL ENCOUNTER (EMERGENCY)
Facility: HOSPITAL | Age: 38
Discharge: LEFT BEFORE DISCHARGE | End: 2023-06-11
Admitting: PHYSICIAN ASSISTANT
Payer: COMMERCIAL

## 2023-06-11 VITALS
TEMPERATURE: 97.6 F | WEIGHT: 165 LBS | HEIGHT: 62 IN | OXYGEN SATURATION: 100 % | RESPIRATION RATE: 14 BRPM | BODY MASS INDEX: 30.36 KG/M2 | DIASTOLIC BLOOD PRESSURE: 82 MMHG | HEART RATE: 93 BPM | SYSTOLIC BLOOD PRESSURE: 119 MMHG

## 2023-06-11 PROCEDURE — 99281 EMR DPT VST MAYX REQ PHY/QHP: CPT

## 2023-06-11 NOTE — ED TRIAGE NOTES
Patient presents here for evaluation of tooth pain and possible overdose of medication. Patient has been experiencing oral pain to the bilateral molar area with radiation down her neck. Submandibular area is soft. Patient asked her friend for ibuprofen and she gave her medication. She initially took four.  Two hours later, because she continued to have pain, she took another 5 tablets. Pain did not improve. When she looked up the pill by the marking on the tablet, it revealed that the medication was docusate.

## 2023-10-07 ENCOUNTER — HEALTH MAINTENANCE LETTER (OUTPATIENT)
Age: 38
End: 2023-10-07

## 2023-12-21 ENCOUNTER — LAB REQUISITION (OUTPATIENT)
Dept: LAB | Facility: CLINIC | Age: 38
End: 2023-12-21

## 2023-12-21 DIAGNOSIS — Z13.1 ENCOUNTER FOR SCREENING FOR DIABETES MELLITUS: ICD-10-CM

## 2023-12-21 DIAGNOSIS — Z13.220 ENCOUNTER FOR SCREENING FOR LIPOID DISORDERS: ICD-10-CM

## 2023-12-21 DIAGNOSIS — F19.91 OTHER PSYCHOACTIVE SUBSTANCE USE, UNSPECIFIED, IN REMISSION: ICD-10-CM

## 2023-12-21 PROCEDURE — 80061 LIPID PANEL: CPT | Performed by: NURSE PRACTITIONER

## 2023-12-21 PROCEDURE — 86803 HEPATITIS C AB TEST: CPT | Performed by: NURSE PRACTITIONER

## 2023-12-21 PROCEDURE — 80053 COMPREHEN METABOLIC PANEL: CPT | Performed by: NURSE PRACTITIONER

## 2023-12-22 LAB
ALBUMIN SERPL BCG-MCNC: 4.7 G/DL (ref 3.5–5.2)
ALP SERPL-CCNC: 73 U/L (ref 40–150)
ALT SERPL W P-5'-P-CCNC: 8 U/L (ref 0–50)
ANION GAP SERPL CALCULATED.3IONS-SCNC: 14 MMOL/L (ref 7–15)
AST SERPL W P-5'-P-CCNC: 12 U/L (ref 0–45)
BILIRUB SERPL-MCNC: 0.3 MG/DL
BUN SERPL-MCNC: 6.3 MG/DL (ref 6–20)
CALCIUM SERPL-MCNC: 9.6 MG/DL (ref 8.6–10)
CHLORIDE SERPL-SCNC: 104 MMOL/L (ref 98–107)
CHOLEST SERPL-MCNC: 180 MG/DL
CREAT SERPL-MCNC: 0.63 MG/DL (ref 0.51–0.95)
DEPRECATED HCO3 PLAS-SCNC: 20 MMOL/L (ref 22–29)
EGFRCR SERPLBLD CKD-EPI 2021: >90 ML/MIN/1.73M2
FASTING STATUS PATIENT QL REPORTED: ABNORMAL
GLUCOSE SERPL-MCNC: 80 MG/DL (ref 70–99)
HCV AB SERPL QL IA: NONREACTIVE
HDLC SERPL-MCNC: 56 MG/DL
LDLC SERPL CALC-MCNC: 112 MG/DL
NONHDLC SERPL-MCNC: 124 MG/DL
POTASSIUM SERPL-SCNC: 3.6 MMOL/L (ref 3.4–5.3)
PROT SERPL-MCNC: 8.2 G/DL (ref 6.4–8.3)
SODIUM SERPL-SCNC: 138 MMOL/L (ref 135–145)
TRIGL SERPL-MCNC: 60 MG/DL

## 2024-02-25 ENCOUNTER — HOSPITAL ENCOUNTER (EMERGENCY)
Facility: CLINIC | Age: 39
Discharge: HOME | End: 2024-02-25
Attending: STUDENT IN AN ORGANIZED HEALTH CARE EDUCATION/TRAINING PROGRAM | Admitting: STUDENT IN AN ORGANIZED HEALTH CARE EDUCATION/TRAINING PROGRAM
Payer: COMMERCIAL

## 2024-02-25 VITALS
TEMPERATURE: 99.2 F | OXYGEN SATURATION: 96 % | WEIGHT: 160 LBS | DIASTOLIC BLOOD PRESSURE: 57 MMHG | HEART RATE: 110 BPM | RESPIRATION RATE: 19 BRPM | BODY MASS INDEX: 29.26 KG/M2 | SYSTOLIC BLOOD PRESSURE: 103 MMHG

## 2024-02-25 DIAGNOSIS — S05.01XA ABRASION OF RIGHT CORNEA, INITIAL ENCOUNTER: ICD-10-CM

## 2024-02-25 PROCEDURE — 99283 EMERGENCY DEPT VISIT LOW MDM: CPT

## 2024-02-25 PROCEDURE — 250N000013 HC RX MED GY IP 250 OP 250 PS 637: Performed by: STUDENT IN AN ORGANIZED HEALTH CARE EDUCATION/TRAINING PROGRAM

## 2024-02-25 PROCEDURE — 250N000009 HC RX 250: Performed by: STUDENT IN AN ORGANIZED HEALTH CARE EDUCATION/TRAINING PROGRAM

## 2024-02-25 RX ORDER — TETRACAINE HYDROCHLORIDE 5 MG/ML
1-2 SOLUTION OPHTHALMIC ONCE
Status: COMPLETED | OUTPATIENT
Start: 2024-02-25 | End: 2024-02-25

## 2024-02-25 RX ORDER — IBUPROFEN 600 MG/1
600 TABLET, FILM COATED ORAL ONCE
Status: COMPLETED | OUTPATIENT
Start: 2024-02-25 | End: 2024-02-25

## 2024-02-25 RX ORDER — ACETAMINOPHEN 325 MG/1
650 TABLET ORAL ONCE
Status: COMPLETED | OUTPATIENT
Start: 2024-02-25 | End: 2024-02-25

## 2024-02-25 RX ORDER — ERYTHROMYCIN 5 MG/G
OINTMENT OPHTHALMIC ONCE
Status: COMPLETED | OUTPATIENT
Start: 2024-02-25 | End: 2024-02-25

## 2024-02-25 RX ORDER — TETRACAINE HYDROCHLORIDE 5 MG/ML
1-2 SOLUTION OPHTHALMIC ONCE
Status: DISCONTINUED | OUTPATIENT
Start: 2024-02-25 | End: 2024-02-25 | Stop reason: HOSPADM

## 2024-02-25 RX ADMIN — IBUPROFEN 600 MG: 600 TABLET ORAL at 21:07

## 2024-02-25 RX ADMIN — FLUORESCEIN SODIUM 1 STRIP: 1 STRIP OPHTHALMIC at 21:06

## 2024-02-25 RX ADMIN — ERYTHROMYCIN 1 G: 5 OINTMENT OPHTHALMIC at 21:07

## 2024-02-25 RX ADMIN — ACETAMINOPHEN 650 MG: 325 TABLET ORAL at 21:07

## 2024-02-25 RX ADMIN — TETRACAINE HYDROCHLORIDE 2 DROP: 5 SOLUTION OPHTHALMIC at 21:06

## 2024-02-25 ASSESSMENT — VISUAL ACUITY
OD: OTHER (SEE COMMENTS)
OS: 20/30

## 2024-02-25 ASSESSMENT — COLUMBIA-SUICIDE SEVERITY RATING SCALE - C-SSRS
1. IN THE PAST MONTH, HAVE YOU WISHED YOU WERE DEAD OR WISHED YOU COULD GO TO SLEEP AND NOT WAKE UP?: NO
2. HAVE YOU ACTUALLY HAD ANY THOUGHTS OF KILLING YOURSELF IN THE PAST MONTH?: NO
6. HAVE YOU EVER DONE ANYTHING, STARTED TO DO ANYTHING, OR PREPARED TO DO ANYTHING TO END YOUR LIFE?: NO

## 2024-02-25 ASSESSMENT — ACTIVITIES OF DAILY LIVING (ADL): ADLS_ACUITY_SCORE: 33

## 2024-02-26 NOTE — DISCHARGE INSTRUCTIONS
You were seen in the emergency department today for right eye injury. Your exam found a large corneal abrasion in the right eye. The treatment for this is topical erythromycin ointment 3-4 times daily in the eye until your symptoms subside.  These are known for being quite painful and causing eye sensitivity. Corneal abrasions usually heal quite quickly. The pressure in your eye is normal and does not indicate glaucoma today.    You may take Ibuprofen up to 400 mg by mouth every 4-6 hours as needed for pain. Do not exceed 2400 mg/day.  You may take Tylenol 325-1000 mg by mouth every 4-6 hours as needed for pain. Do not exceed 1000mg (1g) in 4 hours or 4 g/day from all sources.  You may combine Tylenol and Ibuprofen- up to 400 mg of ibuprofen and 1000 mg of Tylenol at the same time, up to 3 times a day for the pain      I recommend calling [facility] if your symptoms have not improved in a few days.  Return to the emergency department if you develop any severe worsened pain, vision loss, or new flashing lights or floaters in your vision.

## 2024-02-26 NOTE — ED TRIAGE NOTES
Pt was playing with 18 month old and got poke with her finger nail to right eye, right eye red and watery, pain to open eye. Vision blurred.

## 2024-02-26 NOTE — ED NOTES
Level 4 acuity. See MD note for further assessment. Pt refused to perform visual test on R eye. MD aware. Pt not happy with treatment. Requesting for new provider/exam. Prior to new MD, pt had left.

## 2024-02-26 NOTE — ED PROVIDER NOTES
Brief note  9:30 PM Arina Rodrigues PA-C asked to staff the patient with me.  9:37 PM I went to the see the patient in her room and she had left. I never met or saw the patient.       Liz Jung MD  02/25/24 1444

## 2024-02-26 NOTE — ED PROVIDER NOTES
Emergency Department Encounter   NAME: Cynthia Garcia ; AGE: 39 year old female ; YOB: 1985 ; MRN: [id number] ; PCP: Allison Larson   ED PROVIDER: Arina Rodrigues PA-C    Evaluation Date & Time:   No admission date for patient encounter.    CHIEF COMPLAINT:  Eye Pain        Impression and Plan   FINAL IMPRESSION:    ICD-10-CM    1. Abrasion of right cornea, initial encounter  S05.01XA           MDM:  Cynthia Garcia is a 39 year old male with PMH of ADHD, borderline personality disorder, MDD, anxiety, and migraine headaches presenting to the emergency department for evaluation of right eye pain.  She states about an hour prior to arrival she was playing with her 18-month-old and got poked by the her fingernail in her right eye.  She states the right eye is now very painful, rating the pain at 10/10 and she is unable to open the eye.  Endorses some photosensitivity and some blurry vision, no vision loss. She did not take any Tylenol or ibuprofen prior to arrival.  She does not wear contact lenses or have glasses.  She does not get regular eye exams.    Vitals reviewed, she is mildly tachycardic with a pulse of 110. On exam she is tearful and holding her right eye. WES. Differential diagnosis includes but not limited to corneal abrasion, foreign body, traumatic iritis, keratitis, acute angle-closure glaucoma, globe rupture. She was given Tylenol and ibuprofen here for pain. Sclera and conjunctiva mildly injected on the right when compared to the left. No obvious trauma to the globe or hyphema. Woods lamp exam performed with tetracaine and fluorescein dye found a moderate-sized corneal abrasion present immediately in front of the pupil.  No dendritic lesions or Elijah sign. IOP 12 on the right and 10 on the left so I do not suspect acute angle-closure glaucoma. She had improvement of her pain after tetracaine drops were applied and was able to open her eyes. No ciliary flush, no floaters, or  sluggish pupil to indicate traumatic iritis. Patient refused to attempt visual acuity exam due to eye pain. She was given topical erythromycin ointment for the right eye. Upon my return to the room she is requesting more lidocaine drops and I informed the patient that these cannot be used repeatedly for pain due to risk for toxicity. I had already given her 2 rounds of 2 drops about 5 minutes apart. She then grew upset stating she was unhappy with the care I was giving her and did not feel I was taking the appropriate measures to treat her. She is requesting another provider evaluate her so, I informed her I would bring another provider in.  I at that point staffed the patient with Dr. Jung. When Dr. Jung went to the room only a few minutes later to evaluate the patient she had left AMA without discharge instructions or prescription for erythromycin ointment.         Medical Decision Making    History:  Supplemental history from: Documented in chart  External Record(s) reviewed: Documented in chart    Work Up:  Chart documentation includes differential considered and any EKGs or imaging independently interpreted by provider, where specified.  In additional to work up documented, I considered the following work up: Documented in chart, if applicable.    External consultation:  Discussion of management with another provider: Documented in chart, if applicable    Complicating factors:  Care impacted by chronic illness: Mental Health  Care affected by social determinants of health: Access to Affordable Health Care    Disposition considerations:  Patient left the ER department AMA      ED COURSE:  7:41 PM I met and introduced myself to the patient. I gathered initial history and performed my physical exam. We discussed plan for initial workup.   8:51 PM Rechecked patient and used Woods Lamp to exam patient's right eye.  9:27 PM RN reports patient being dramatic and not allowing her to touch or examine right  eye.  9:35 PM Rechecked patient and patient states that she didn't like me and wanted to see someone else, so I staffed with Dr. Jung, ED MD.  9:37 PM Dr. Jung attempted to see and exam the patient but wasn't able to because patient left the department AMA.    At the conclusion of the encounter I discussed the results of all the tests and the disposition. The questions were answered. The patient or family acknowledged understanding and was agreeable with the care plan.        MEDICATIONS GIVEN IN THE EMERGENCY DEPARTMENT:  Medications   tetracaine (PONTOCAINE) 0.5 % ophthalmic solution 1-2 drop (2 drops Right Eye $Given 2/25/24 2106)   fluorescein (FUL-FLORIDA) ophthalmic strip 1 strip (1 strip Right Eye $Given 2/25/24 2106)   ibuprofen (ADVIL/MOTRIN) tablet 600 mg (600 mg Oral $Given 2/25/24 2107)   acetaminophen (TYLENOL) tablet 650 mg (650 mg Oral $Given 2/25/24 2107)   erythromycin (ROMYCIN) ophthalmic ointment (1 g Right Eye $Given 2/25/24 2107)         NEW PRESCRIPTIONS STARTED AT TODAY'S ED VISIT:  Discharge Medication List as of 2/25/2024  9:57 PM            HPI   Patient information was obtained from: Patient   Use of Intrepreter: N/A    Cynthia Garcia is a 39 year old female with a pertinent history of anxiety, who presents to the ED by private car for evaluation of eye pain.    Patient reports 1 hour prior to arrival, she was poked in the right eye by a 18 month old. She was poked in the eye by the child's finger nail. She endorses 10/10 pain, especially when trying to open the eye and reports photosensitivity. Notes right eye is red and watery and vision is blurred. No other reported complaints or concerns at this time.      Medical History     Past Medical History:   Diagnosis Date    Anxiety     ASCUS of cervix with negative high risk HPV 7/15/2009    Chemical dependency (H)     High-risk pregnancy, unspecified trimester 11/8/2017    Insufficient prenatal care 9/2/2022    Low lying placenta,  antepartum 9/6/2017    Methamphetamine abuse (H) 6/6/2017    Single liveborn infant delivered vaginally 9/2/2022       Past Surgical History:   Procedure Laterality Date    DENTAL SURGERY      [place] teeth removed    ENT SURGERY      Tonsillectomy age 18    TONSILLECTOMY         Family History   Problem Relation Age of Onset    Lung Cancer Mother         Smoker    Substance Abuse Mother     Diabetes Maternal Grandmother     Depression Maternal Grandmother     Anxiety Disorder Maternal Grandmother     Substance Abuse Father     Substance Abuse Brother     Depression Brother     Substance Abuse Brother     Depression Brother        Social History     Tobacco Use    Smoking status: Every Day     Packs/day: 0.25     Years: 10.00     Additional pack years: 0.00     Total pack years: 2.50     Types: Cigarettes    Smokeless tobacco: Never   Substance Use Topics    Alcohol use: No    Drug use: Not Currently     Types: IV, Methamphetamines     Comment: Clean from meth 11/6/17       acetaminophen (TYLENOL) 500 MG tablet  ibuprofen (ADVIL/MOTRIN) 600 MG tablet  omeprazole (PRILOSEC) 10 MG DR capsule  Prenatal Vit-Fe Fumarate-FA (PRENATAL VITAMINS PO)          Physical Exam     First Vitals:  Patient Vitals for the past 24 hrs:   BP Temp Temp src Pulse Resp SpO2 Weight   02/25/24 2100 103/57 -- -- -- -- -- --   02/25/24 1906 116/57 99.2  F (37.3  C) Temporal 110 19 96 % 72.6 kg (160 lb)         PHYSICAL EXAM    General Appearance:  Alert, cooperative, no distress, appears stated age. Patient is holding her right eye.    HENT: Normocephalic without obvious deformity. Mucous membranes moist   Eyes: Conjunctiva mildly injected, Lids normal- no periorbital swelling. No discharge.  No pain with extraocular movements. No APD. WES. Dominguez lamp exam found a 0.5 cm area of increased dye uptake just anterior to the pupil consistent with a corneal abrasion. No dendritic lesions. Negative Elijah's. Ciliary body without  inflammation.  Neurologic: Alert and orientated x3. No focal deficits.  Visual fields full to my confrontation.  Psych: Normal mood. Becomes tearful and moans every time I entered the room.        Results     LAB:  All pertinent labs reviewed and interpreted  Labs Ordered and Resulted from Time of ED Arrival to Time of ED Departure - No data to display    RADIOLOGY:  No orders to display         ECG:  None.      PROCEDURES:  PROCEDURE: Woods lamp Exam   INDICATIONS: Right eye   PROCEDURE PROVIDER: Arina Rodrigues PA-C   SITE: right eye   CONSENT:  The risks, benefits and alternatives for this procedure were explained to the patient and verbally accepted.     MEDICATION: fluorescein stain and tetracaine   EXAM FINDINGS: Right Eye: there is a 0.5cm area of increased dye uptake consistent with a corneal abrasion. No dendritic lesions or glen's sign.   COMPLICATIONS: Patient tolerated procedure well, without complication         I, Priscila Rodriguez, am serving as a scribe to document services personally performed by Arina Rodrigues PA-C, based on my observation and the provider's statements to me. I, Arina Rodrigues PA-C attest that Priscila Rodriguez is acting in a scribe capacity, has observed my performance of the services and has documented them in accordance with my direction.       Arina Rodrigues PA-C   Emergency Medicine   [facility] EMERGENCY ROOM       Arina Rodrigues PA-C  02/26/24 0032

## 2024-11-30 ENCOUNTER — HEALTH MAINTENANCE LETTER (OUTPATIENT)
Age: 39
End: 2024-11-30

## 2025-01-24 ENCOUNTER — HOSPITAL ENCOUNTER (EMERGENCY)
Facility: CLINIC | Age: 40
Discharge: HOME | End: 2025-01-24
Attending: STUDENT IN AN ORGANIZED HEALTH CARE EDUCATION/TRAINING PROGRAM | Admitting: STUDENT IN AN ORGANIZED HEALTH CARE EDUCATION/TRAINING PROGRAM
Payer: COMMERCIAL

## 2025-01-24 VITALS
TEMPERATURE: 97.5 F | HEIGHT: 62 IN | RESPIRATION RATE: 18 BRPM | DIASTOLIC BLOOD PRESSURE: 68 MMHG | SYSTOLIC BLOOD PRESSURE: 105 MMHG | WEIGHT: 155 LBS | HEART RATE: 72 BPM | BODY MASS INDEX: 28.52 KG/M2 | OXYGEN SATURATION: 99 %

## 2025-01-24 DIAGNOSIS — S05.01XA ABRASION OF RIGHT CORNEA, INITIAL ENCOUNTER: ICD-10-CM

## 2025-01-24 PROCEDURE — 99284 EMERGENCY DEPT VISIT MOD MDM: CPT

## 2025-01-24 PROCEDURE — 250N000009 HC RX 250: Performed by: STUDENT IN AN ORGANIZED HEALTH CARE EDUCATION/TRAINING PROGRAM

## 2025-01-24 RX ORDER — ERYTHROMYCIN 5 MG/G
OINTMENT OPHTHALMIC ONCE
Status: COMPLETED | OUTPATIENT
Start: 2025-01-24 | End: 2025-01-24

## 2025-01-24 RX ORDER — DICLOFENAC SODIUM 1 MG/ML
1 SOLUTION/ DROPS OPHTHALMIC ONCE
Status: COMPLETED | OUTPATIENT
Start: 2025-01-24 | End: 2025-01-24

## 2025-01-24 RX ORDER — TETRACAINE HYDROCHLORIDE 5 MG/ML
1-2 SOLUTION OPHTHALMIC ONCE
Status: COMPLETED | OUTPATIENT
Start: 2025-01-24 | End: 2025-01-24

## 2025-01-24 RX ORDER — ERYTHROMYCIN 5 MG/G
0.5 OINTMENT OPHTHALMIC 4 TIMES DAILY
Qty: 3.5 G | Refills: 1 | Status: SHIPPED | OUTPATIENT
Start: 2025-01-24 | End: 2025-02-03

## 2025-01-24 RX ORDER — KETOROLAC TROMETHAMINE 5 MG/ML
1 SOLUTION OPHTHALMIC 4 TIMES DAILY
Qty: 5 ML | Refills: 0 | Status: SHIPPED | OUTPATIENT
Start: 2025-01-24 | End: 2025-02-03

## 2025-01-24 RX ADMIN — DICLOFENAC SODIUM 1 DROP: 1 SOLUTION OPHTHALMIC at 05:06

## 2025-01-24 RX ADMIN — ERYTHROMYCIN 1 G: 5 OINTMENT OPHTHALMIC at 05:06

## 2025-01-24 RX ADMIN — FLUORESCEIN SODIUM 1 STRIP: 1 STRIP OPHTHALMIC at 04:39

## 2025-01-24 RX ADMIN — TETRACAINE HYDROCHLORIDE 1 DROP: 5 SOLUTION OPHTHALMIC at 04:39

## 2025-01-24 ASSESSMENT — COLUMBIA-SUICIDE SEVERITY RATING SCALE - C-SSRS
2. HAVE YOU ACTUALLY HAD ANY THOUGHTS OF KILLING YOURSELF IN THE PAST MONTH?: NO
1. IN THE PAST MONTH, HAVE YOU WISHED YOU WERE DEAD OR WISHED YOU COULD GO TO SLEEP AND NOT WAKE UP?: NO
6. HAVE YOU EVER DONE ANYTHING, STARTED TO DO ANYTHING, OR PREPARED TO DO ANYTHING TO END YOUR LIFE?: NO

## 2025-01-24 ASSESSMENT — VISUAL ACUITY
OD: OTHER (SEE COMMENTS)
OS: 20/40

## 2025-01-24 ASSESSMENT — ACTIVITIES OF DAILY LIVING (ADL): ADLS_ACUITY_SCORE: 41
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
## 2025-04-29 ENCOUNTER — LAB REQUISITION (OUTPATIENT)
Dept: LAB | Facility: CLINIC | Age: 40
End: 2025-04-29

## 2025-04-29 DIAGNOSIS — R14.0 ABDOMINAL DISTENSION (GASEOUS): ICD-10-CM

## 2025-04-29 PROCEDURE — 87186 SC STD MICRODIL/AGAR DIL: CPT | Performed by: NURSE PRACTITIONER

## 2025-04-30 LAB — BACTERIA UR CULT: ABNORMAL

## 2025-08-13 ENCOUNTER — HOSPITAL ENCOUNTER (EMERGENCY)
Facility: CLINIC | Age: 40
Discharge: HOME | End: 2025-08-13
Payer: COMMERCIAL

## 2025-08-13 VITALS
DIASTOLIC BLOOD PRESSURE: 77 MMHG | SYSTOLIC BLOOD PRESSURE: 116 MMHG | OXYGEN SATURATION: 97 % | TEMPERATURE: 98 F | RESPIRATION RATE: 17 BRPM | HEART RATE: 89 BPM

## 2025-08-13 DIAGNOSIS — B96.89 BACTERIAL VAGINITIS: Primary | ICD-10-CM

## 2025-08-13 DIAGNOSIS — N76.0 BACTERIAL VAGINITIS: Primary | ICD-10-CM

## 2025-08-13 LAB
ALBUMIN UR-MCNC: NEGATIVE MG/DL
APPEARANCE UR: CLEAR
BACTERIA #/AREA URNS HPF: ABNORMAL /HPF
BILIRUB UR QL STRIP: NEGATIVE
CLUE CELLS: PRESENT
COLOR UR AUTO: ABNORMAL
GLUCOSE UR STRIP-MCNC: NEGATIVE MG/DL
HGB UR QL STRIP: ABNORMAL
KETONES UR STRIP-MCNC: NEGATIVE MG/DL
LEUKOCYTE ESTERASE UR QL STRIP: ABNORMAL
NITRATE UR QL: NEGATIVE
PH UR STRIP: 5.5 [PH] (ref 5–7)
RBC URINE: 4 /HPF
SP GR UR STRIP: 1.01 (ref 1–1.03)
SQUAMOUS EPITHELIAL: 1 /HPF
TRICHOMONAS, WET PREP: ABNORMAL
UROBILINOGEN UR STRIP-MCNC: NORMAL MG/DL
WBC URINE: 6 /HPF
WBC'S/HIGH POWER FIELD, WET PREP: ABNORMAL
YEAST, WET PREP: ABNORMAL

## 2025-08-13 PROCEDURE — G0463 HOSPITAL OUTPT CLINIC VISIT: HCPCS

## 2025-08-13 PROCEDURE — 99213 OFFICE O/P EST LOW 20 MIN: CPT

## 2025-08-13 PROCEDURE — 87088 URINE BACTERIA CULTURE: CPT

## 2025-08-13 PROCEDURE — 87491 CHLMYD TRACH DNA AMP PROBE: CPT

## 2025-08-13 PROCEDURE — 87210 SMEAR WET MOUNT SALINE/INK: CPT

## 2025-08-13 PROCEDURE — 81003 URINALYSIS AUTO W/O SCOPE: CPT

## 2025-08-13 RX ORDER — METRONIDAZOLE 500 MG/1
500 TABLET ORAL 2 TIMES DAILY
Qty: 14 TABLET | Refills: 0 | Status: SHIPPED | OUTPATIENT
Start: 2025-08-13 | End: 2025-08-20

## 2025-08-13 ASSESSMENT — ACTIVITIES OF DAILY LIVING (ADL): ADLS_ACUITY_SCORE: 41

## 2025-08-13 ASSESSMENT — COLUMBIA-SUICIDE SEVERITY RATING SCALE - C-SSRS
6. HAVE YOU EVER DONE ANYTHING, STARTED TO DO ANYTHING, OR PREPARED TO DO ANYTHING TO END YOUR LIFE?: NO
2. HAVE YOU ACTUALLY HAD ANY THOUGHTS OF KILLING YOURSELF IN THE PAST MONTH?: NO
1. IN THE PAST MONTH, HAVE YOU WISHED YOU WERE DEAD OR WISHED YOU COULD GO TO SLEEP AND NOT WAKE UP?: NO

## 2025-08-14 LAB
C TRACH DNA SPEC QL PROBE+SIG AMP: NEGATIVE
N GONORRHOEA DNA SPEC QL NAA+PROBE: NEGATIVE
SPECIMEN TYPE: NORMAL

## 2025-08-15 LAB
BACTERIA UR CULT: ABNORMAL
BACTERIA UR CULT: ABNORMAL